# Patient Record
Sex: FEMALE | Employment: UNEMPLOYED | URBAN - METROPOLITAN AREA
[De-identification: names, ages, dates, MRNs, and addresses within clinical notes are randomized per-mention and may not be internally consistent; named-entity substitution may affect disease eponyms.]

---

## 2019-01-01 ENCOUNTER — HOME CARE VISIT (OUTPATIENT)
Dept: HOSPICE | Facility: HOSPICE | Age: 84
End: 2019-01-01
Payer: MEDICARE

## 2019-01-01 ENCOUNTER — HOME CARE VISIT (OUTPATIENT)
Dept: SCHEDULING | Facility: HOME HEALTH | Age: 84
End: 2019-01-01
Payer: MEDICARE

## 2019-01-01 ENCOUNTER — HOSPITAL ENCOUNTER (INPATIENT)
Age: 84
LOS: 7 days | Discharge: HOME | End: 2019-07-15
Attending: INTERNAL MEDICINE | Admitting: INTERNAL MEDICINE

## 2019-01-01 ENCOUNTER — HOSPICE ADMISSION (OUTPATIENT)
Dept: HOSPICE | Facility: HOSPICE | Age: 84
End: 2019-01-01
Payer: MEDICARE

## 2019-01-01 VITALS — RESPIRATION RATE: 19 BRPM | HEART RATE: 104 BPM | SYSTOLIC BLOOD PRESSURE: 84 MMHG | DIASTOLIC BLOOD PRESSURE: 58 MMHG

## 2019-01-01 VITALS — DIASTOLIC BLOOD PRESSURE: 60 MMHG | SYSTOLIC BLOOD PRESSURE: 92 MMHG | RESPIRATION RATE: 22 BRPM | HEART RATE: 112 BPM

## 2019-01-01 VITALS
HEART RATE: 84 BPM | RESPIRATION RATE: 16 BRPM | SYSTOLIC BLOOD PRESSURE: 120 MMHG | DIASTOLIC BLOOD PRESSURE: 61 MMHG | TEMPERATURE: 96.5 F

## 2019-01-01 VITALS — RESPIRATION RATE: 22 BRPM | DIASTOLIC BLOOD PRESSURE: 60 MMHG | HEART RATE: 80 BPM | SYSTOLIC BLOOD PRESSURE: 92 MMHG

## 2019-01-01 VITALS — SYSTOLIC BLOOD PRESSURE: 80 MMHG | HEART RATE: 105 BPM | RESPIRATION RATE: 16 BRPM | DIASTOLIC BLOOD PRESSURE: 50 MMHG

## 2019-01-01 VITALS
HEART RATE: 89 BPM | DIASTOLIC BLOOD PRESSURE: 52 MMHG | SYSTOLIC BLOOD PRESSURE: 88 MMHG | HEART RATE: 98 BPM | SYSTOLIC BLOOD PRESSURE: 84 MMHG | RESPIRATION RATE: 16 BRPM | RESPIRATION RATE: 17 BRPM | DIASTOLIC BLOOD PRESSURE: 54 MMHG

## 2019-01-01 VITALS — HEART RATE: 96 BPM | RESPIRATION RATE: 20 BRPM | SYSTOLIC BLOOD PRESSURE: 90 MMHG | DIASTOLIC BLOOD PRESSURE: 62 MMHG

## 2019-01-01 VITALS
TEMPERATURE: 97.8 F | DIASTOLIC BLOOD PRESSURE: 60 MMHG | RESPIRATION RATE: 16 BRPM | SYSTOLIC BLOOD PRESSURE: 90 MMHG | HEART RATE: 104 BPM

## 2019-01-01 VITALS — RESPIRATION RATE: 22 BRPM | DIASTOLIC BLOOD PRESSURE: 62 MMHG | SYSTOLIC BLOOD PRESSURE: 110 MMHG | HEART RATE: 88 BPM

## 2019-01-01 VITALS
RESPIRATION RATE: 22 BRPM | SYSTOLIC BLOOD PRESSURE: 100 MMHG | HEART RATE: 104 BPM | TEMPERATURE: 98.6 F | DIASTOLIC BLOOD PRESSURE: 60 MMHG

## 2019-01-01 VITALS — DIASTOLIC BLOOD PRESSURE: 62 MMHG | HEART RATE: 92 BPM | RESPIRATION RATE: 22 BRPM | SYSTOLIC BLOOD PRESSURE: 90 MMHG

## 2019-01-01 VITALS — HEART RATE: 96 BPM | DIASTOLIC BLOOD PRESSURE: 60 MMHG | RESPIRATION RATE: 24 BRPM | SYSTOLIC BLOOD PRESSURE: 90 MMHG

## 2019-01-01 VITALS
DIASTOLIC BLOOD PRESSURE: 80 MMHG | TEMPERATURE: 98.4 F | SYSTOLIC BLOOD PRESSURE: 110 MMHG | HEART RATE: 108 BPM | RESPIRATION RATE: 22 BRPM

## 2019-01-01 VITALS
DIASTOLIC BLOOD PRESSURE: 66 MMHG | SYSTOLIC BLOOD PRESSURE: 102 MMHG | DIASTOLIC BLOOD PRESSURE: 68 MMHG | SYSTOLIC BLOOD PRESSURE: 102 MMHG | RESPIRATION RATE: 19 BRPM | HEART RATE: 95 BPM | RESPIRATION RATE: 17 BRPM

## 2019-01-01 VITALS — HEART RATE: 100 BPM | DIASTOLIC BLOOD PRESSURE: 70 MMHG | SYSTOLIC BLOOD PRESSURE: 140 MMHG | RESPIRATION RATE: 24 BRPM

## 2019-01-01 VITALS
SYSTOLIC BLOOD PRESSURE: 124 MMHG | TEMPERATURE: 98.3 F | RESPIRATION RATE: 24 BRPM | HEART RATE: 96 BPM | DIASTOLIC BLOOD PRESSURE: 70 MMHG

## 2019-01-01 VITALS — HEART RATE: 104 BPM | SYSTOLIC BLOOD PRESSURE: 90 MMHG | DIASTOLIC BLOOD PRESSURE: 60 MMHG | RESPIRATION RATE: 22 BRPM

## 2019-01-01 VITALS — RESPIRATION RATE: 20 BRPM | TEMPERATURE: 98.6 F | HEART RATE: 100 BPM

## 2019-01-01 VITALS — HEART RATE: 115 BPM

## 2019-01-01 DIAGNOSIS — I27.82 CHRONIC SEPTIC PULMONARY EMBOLISM WITH ACUTE COR PULMONALE (HCC): ICD-10-CM

## 2019-01-01 DIAGNOSIS — I26.01 CHRONIC SEPTIC PULMONARY EMBOLISM WITH ACUTE COR PULMONALE (HCC): ICD-10-CM

## 2019-01-01 DIAGNOSIS — I50.43 ACUTE ON CHRONIC COMBINED SYSTOLIC AND DIASTOLIC CONGESTIVE HEART FAILURE (HCC): ICD-10-CM

## 2019-01-01 DIAGNOSIS — J44.1 COPD EXACERBATION (HCC): ICD-10-CM

## 2019-01-01 DIAGNOSIS — J18.9 HCAP (HEALTHCARE-ASSOCIATED PNEUMONIA): ICD-10-CM

## 2019-01-01 PROCEDURE — 0651 HSPC ROUTINE HOME CARE

## 2019-01-01 PROCEDURE — HOSPICE MEDICATION HC HH HOSPICE MEDICATION

## 2019-01-01 PROCEDURE — G0299 HHS/HOSPICE OF RN EA 15 MIN: HCPCS

## 2019-01-01 PROCEDURE — 74011250637 HC RX REV CODE- 250/637: Performed by: NURSE PRACTITIONER

## 2019-01-01 PROCEDURE — 3336590001 HSPC ROOM AND BOARD

## 2019-01-01 PROCEDURE — G0155 HHCP-SVS OF CSW,EA 15 MIN: HCPCS

## 2019-01-01 PROCEDURE — G0156 HHCP-SVS OF AIDE,EA 15 MIN: HCPCS

## 2019-01-01 PROCEDURE — T4541 LARGE DISPOSABLE UNDERPAD: HCPCS

## 2019-01-01 PROCEDURE — 74011636637 HC RX REV CODE- 636/637: Performed by: NURSE PRACTITIONER

## 2019-01-01 PROCEDURE — T4521 ADULT SIZE BRIEF/DIAPER SM: HCPCS

## 2019-01-01 PROCEDURE — 74011000250 HC RX REV CODE- 250: Performed by: NURSE PRACTITIONER

## 2019-01-01 PROCEDURE — A4927 NON-STERILE GLOVES: HCPCS

## 2019-01-01 PROCEDURE — T4523 ADULT SIZE BRIEF/DIAPER LG: HCPCS

## 2019-01-01 PROCEDURE — A6250 SKIN SEAL PROTECT MOISTURIZR: HCPCS

## 2019-01-01 PROCEDURE — 99232 SBSQ HOSP IP/OBS MODERATE 35: CPT | Performed by: INTERNAL MEDICINE

## 2019-01-01 PROCEDURE — 3331090004 HSPC SERVICE INTENSITY ADD-ON

## 2019-01-01 PROCEDURE — 0656 HSPC GENERAL INPATIENT

## 2019-01-01 PROCEDURE — A9270 NON-COVERED ITEM OR SERVICE: HCPCS

## 2019-01-01 PROCEDURE — 74011250637 HC RX REV CODE- 250/637: Performed by: FAMILY MEDICINE

## 2019-01-01 PROCEDURE — 3336500001 HSPC ELECTION

## 2019-01-01 RX ORDER — PREDNISONE 10 MG/1
10 TABLET ORAL 2 TIMES DAILY WITH MEALS
Status: DISCONTINUED | OUTPATIENT
Start: 2019-01-01 | End: 2019-01-01 | Stop reason: HOSPADM

## 2019-01-01 RX ORDER — FACIAL-BODY WIPES
10 EACH TOPICAL AS NEEDED
Status: DISCONTINUED | OUTPATIENT
Start: 2019-01-01 | End: 2019-01-01 | Stop reason: HOSPADM

## 2019-01-01 RX ORDER — PANTOPRAZOLE SODIUM 40 MG/1
40 TABLET, DELAYED RELEASE ORAL DAILY
COMMUNITY
End: 2019-01-01

## 2019-01-01 RX ORDER — FLUTICASONE PROPIONATE 50 MCG
2 SPRAY, SUSPENSION (ML) NASAL DAILY
Status: ON HOLD | COMMUNITY
End: 2019-01-01 | Stop reason: SDUPTHER

## 2019-01-01 RX ORDER — SENNOSIDES 8.6 MG/1
1 TABLET ORAL 2 TIMES DAILY
Qty: 10 TAB | Refills: 0 | Status: SHIPPED | OUTPATIENT
Start: 2019-01-01

## 2019-01-01 RX ORDER — MORPHINE SULFATE 100 MG/5ML
5 SOLUTION ORAL
Status: DISCONTINUED | OUTPATIENT
Start: 2019-01-01 | End: 2019-01-01 | Stop reason: HOSPADM

## 2019-01-01 RX ORDER — IPRATROPIUM BROMIDE AND ALBUTEROL SULFATE 2.5; .5 MG/3ML; MG/3ML
3 SOLUTION RESPIRATORY (INHALATION)
Qty: 30 NEBULE | Refills: 0 | Status: SHIPPED | OUTPATIENT
Start: 2019-01-01

## 2019-01-01 RX ORDER — FLUTICASONE FUROATE AND VILANTEROL 100; 25 UG/1; UG/1
1 POWDER RESPIRATORY (INHALATION) DAILY
Qty: 1 INHALER | Refills: 0 | Status: SHIPPED | OUTPATIENT
Start: 2019-01-01

## 2019-01-01 RX ORDER — HALOPERIDOL 5 MG/ML
2 INJECTION INTRAMUSCULAR
Status: DISCONTINUED | OUTPATIENT
Start: 2019-01-01 | End: 2019-01-01

## 2019-01-01 RX ORDER — FLUTICASONE FUROATE AND VILANTEROL 100; 25 UG/1; UG/1
1 POWDER RESPIRATORY (INHALATION) DAILY
Status: ON HOLD | COMMUNITY
Start: 2019-01-01 | End: 2019-01-01 | Stop reason: SDUPTHER

## 2019-01-01 RX ORDER — ACETAMINOPHEN 325 MG/1
650 TABLET ORAL
Status: DISCONTINUED | OUTPATIENT
Start: 2019-01-01 | End: 2019-01-01 | Stop reason: HOSPADM

## 2019-01-01 RX ORDER — ACETAMINOPHEN 650 MG/1
650 SUPPOSITORY RECTAL
Status: DISCONTINUED | OUTPATIENT
Start: 2019-01-01 | End: 2019-01-01

## 2019-01-01 RX ORDER — FUROSEMIDE 20 MG/1
20 TABLET ORAL DAILY
Qty: 5 TAB | Refills: 0 | Status: SHIPPED | OUTPATIENT
Start: 2019-01-01

## 2019-01-01 RX ORDER — PREDNISONE 10 MG/1
20 TABLET ORAL
Status: DISCONTINUED | OUTPATIENT
Start: 2019-01-01 | End: 2019-01-01

## 2019-01-01 RX ORDER — OMEPRAZOLE 20 MG/1
20 CAPSULE, DELAYED RELEASE ORAL DAILY
Qty: 5 CAP | Refills: 0 | Status: SHIPPED | OUTPATIENT
Start: 2019-01-01

## 2019-01-01 RX ORDER — CLONAZEPAM 0.5 MG/1
0.5 TABLET ORAL EVERY 12 HOURS
Qty: 10 TAB | Refills: 0 | Status: SHIPPED | OUTPATIENT
Start: 2019-01-01

## 2019-01-01 RX ORDER — LORAZEPAM 1 MG/1
1 TABLET ORAL EVERY 12 HOURS
Status: DISCONTINUED | OUTPATIENT
Start: 2019-01-01 | End: 2019-01-01 | Stop reason: HOSPADM

## 2019-01-01 RX ORDER — SENNOSIDES 8.6 MG/1
1 TABLET ORAL
COMMUNITY
End: 2019-01-01

## 2019-01-01 RX ORDER — LORATADINE 10 MG/1
10 TABLET ORAL
Status: DISCONTINUED | OUTPATIENT
Start: 2019-01-01 | End: 2019-01-01

## 2019-01-01 RX ORDER — BUDESONIDE AND FORMOTEROL FUMARATE DIHYDRATE 160; 4.5 UG/1; UG/1
2 AEROSOL RESPIRATORY (INHALATION) 2 TIMES DAILY
Status: DISCONTINUED | OUTPATIENT
Start: 2019-01-01 | End: 2019-01-01

## 2019-01-01 RX ORDER — ACETAMINOPHEN 325 MG/1
650 TABLET ORAL
Qty: 10 TAB | Refills: 0 | Status: SHIPPED | OUTPATIENT
Start: 2019-01-01

## 2019-01-01 RX ORDER — FLUTICASONE FUROATE AND VILANTEROL 100; 25 UG/1; UG/1
1 POWDER RESPIRATORY (INHALATION) DAILY
Status: DISCONTINUED | OUTPATIENT
Start: 2019-01-01 | End: 2019-01-01 | Stop reason: HOSPADM

## 2019-01-01 RX ORDER — FLUCONAZOLE 100 MG/1
200 TABLET ORAL
Status: DISCONTINUED | OUTPATIENT
Start: 2019-01-01 | End: 2019-01-01 | Stop reason: HOSPADM

## 2019-01-01 RX ORDER — MORPHINE SULFATE 2 MG/ML
2 INJECTION, SOLUTION INTRAMUSCULAR; INTRAVENOUS
Status: DISCONTINUED | OUTPATIENT
Start: 2019-01-01 | End: 2019-01-01 | Stop reason: HOSPADM

## 2019-01-01 RX ORDER — METHIMAZOLE 10 MG/1
5 TABLET ORAL DAILY
Status: DISCONTINUED | OUTPATIENT
Start: 2019-01-01 | End: 2019-01-01 | Stop reason: HOSPADM

## 2019-01-01 RX ORDER — DOCUSATE SODIUM 100 MG/1
100 CAPSULE, LIQUID FILLED ORAL 2 TIMES DAILY
COMMUNITY
End: 2019-01-01

## 2019-01-01 RX ORDER — FUROSEMIDE 20 MG/1
20 TABLET ORAL DAILY
Status: ON HOLD | COMMUNITY
End: 2019-01-01 | Stop reason: SDUPTHER

## 2019-01-01 RX ORDER — MORPHINE SULFATE 2 MG/ML
2 INJECTION, SOLUTION INTRAMUSCULAR; INTRAVENOUS
Status: DISCONTINUED | OUTPATIENT
Start: 2019-01-01 | End: 2019-01-01

## 2019-01-01 RX ORDER — ONDANSETRON 4 MG/1
4 TABLET, ORALLY DISINTEGRATING ORAL
Status: ON HOLD | COMMUNITY
End: 2019-01-01 | Stop reason: SDUPTHER

## 2019-01-01 RX ORDER — HALOPERIDOL 2 MG/ML
2 SOLUTION ORAL
Status: DISCONTINUED | OUTPATIENT
Start: 2019-01-01 | End: 2019-01-01

## 2019-01-01 RX ORDER — WARFARIN 2 MG/1
2 TABLET ORAL
Status: DISCONTINUED | OUTPATIENT
Start: 2019-01-01 | End: 2019-01-01

## 2019-01-01 RX ORDER — FUROSEMIDE 40 MG/1
20 TABLET ORAL DAILY
Status: DISCONTINUED | OUTPATIENT
Start: 2019-01-01 | End: 2019-01-01 | Stop reason: HOSPADM

## 2019-01-01 RX ORDER — FLUTICASONE PROPIONATE 50 MCG
2 SPRAY, SUSPENSION (ML) NASAL DAILY
Status: DISCONTINUED | OUTPATIENT
Start: 2019-01-01 | End: 2019-01-01 | Stop reason: HOSPADM

## 2019-01-01 RX ORDER — GLYCOPYRROLATE 0.2 MG/ML
0.2 INJECTION INTRAMUSCULAR; INTRAVENOUS
Status: DISCONTINUED | OUTPATIENT
Start: 2019-01-01 | End: 2019-01-01

## 2019-01-01 RX ORDER — PREDNISONE 10 MG/1
10 TABLET ORAL 2 TIMES DAILY WITH MEALS
Qty: 10 TAB | Refills: 0 | Status: SHIPPED | OUTPATIENT
Start: 2019-01-01

## 2019-01-01 RX ORDER — IPRATROPIUM BROMIDE AND ALBUTEROL SULFATE 2.5; .5 MG/3ML; MG/3ML
3 SOLUTION RESPIRATORY (INHALATION)
Status: DISCONTINUED | OUTPATIENT
Start: 2019-01-01 | End: 2019-01-01 | Stop reason: HOSPADM

## 2019-01-01 RX ORDER — CLONAZEPAM 0.5 MG/1
0.5 TABLET ORAL
Qty: 15 TAB | Refills: 0 | Status: SHIPPED | OUTPATIENT
Start: 2019-01-01

## 2019-01-01 RX ORDER — CLONAZEPAM 1 MG/1
0.5 TABLET ORAL
Status: DISCONTINUED | OUTPATIENT
Start: 2019-01-01 | End: 2019-01-01

## 2019-01-01 RX ORDER — CETIRIZINE HCL 10 MG
10 TABLET ORAL
Qty: 5 TAB | Refills: 0 | Status: SHIPPED | OUTPATIENT
Start: 2019-01-01

## 2019-01-01 RX ORDER — FLUTICASONE PROPIONATE 50 MCG
2 SPRAY, SUSPENSION (ML) NASAL DAILY
Qty: 1 BOTTLE | Refills: 0 | Status: SHIPPED | OUTPATIENT
Start: 2019-01-01

## 2019-01-01 RX ORDER — CETIRIZINE HCL 10 MG
10 TABLET ORAL
Status: ON HOLD | COMMUNITY
End: 2019-01-01 | Stop reason: SDUPTHER

## 2019-01-01 RX ORDER — FLUCONAZOLE 200 MG/1
200 TABLET ORAL DAILY
Qty: 1 TAB | Refills: 0 | Status: SHIPPED | OUTPATIENT
Start: 2019-01-01 | End: 2019-01-01

## 2019-01-01 RX ORDER — SENNOSIDES 8.6 MG/1
1 TABLET ORAL 2 TIMES DAILY
Status: DISCONTINUED | OUTPATIENT
Start: 2019-01-01 | End: 2019-01-01 | Stop reason: HOSPADM

## 2019-01-01 RX ORDER — CETIRIZINE HCL 10 MG
10 TABLET ORAL
Status: DISCONTINUED | OUTPATIENT
Start: 2019-01-01 | End: 2019-01-01 | Stop reason: HOSPADM

## 2019-01-01 RX ORDER — METHIMAZOLE 10 MG/1
5 TABLET ORAL DAILY
Status: DISCONTINUED | OUTPATIENT
Start: 2019-01-01 | End: 2019-01-01 | Stop reason: SDUPTHER

## 2019-01-01 RX ORDER — SODIUM CHLORIDE 0.9 % (FLUSH) 0.9 %
3 SYRINGE (ML) INJECTION EVERY 12 HOURS
Status: DISCONTINUED | OUTPATIENT
Start: 2019-01-01 | End: 2019-01-01

## 2019-01-01 RX ORDER — CLONAZEPAM 1 MG/1
0.5 TABLET ORAL EVERY 12 HOURS
Status: DISCONTINUED | OUTPATIENT
Start: 2019-01-01 | End: 2019-01-01

## 2019-01-01 RX ORDER — POLYVINYL ALCOHOL 14 MG/ML
1 SOLUTION/ DROPS OPHTHALMIC AS NEEDED
Status: DISCONTINUED | OUTPATIENT
Start: 2019-01-01 | End: 2019-01-01 | Stop reason: HOSPADM

## 2019-01-01 RX ORDER — MORPHINE SULFATE 100 MG/5ML
5 SOLUTION ORAL
Qty: 30 ML | Refills: 0 | Status: SHIPPED | OUTPATIENT
Start: 2019-01-01 | End: 2019-01-01

## 2019-01-01 RX ORDER — METHIMAZOLE 10 MG/1
5 TABLET ORAL DAILY
Qty: 1 TAB | Refills: 0 | Status: SHIPPED | OUTPATIENT
Start: 2019-01-01

## 2019-01-01 RX ORDER — CETIRIZINE HCL 10 MG
10 TABLET ORAL
Status: DISCONTINUED | OUTPATIENT
Start: 2019-01-01 | End: 2019-01-01 | Stop reason: SDUPTHER

## 2019-01-01 RX ORDER — POLYVINYL ALCOHOL 14 MG/ML
1 SOLUTION/ DROPS OPHTHALMIC AS NEEDED
Qty: 15 ML | Refills: 0 | Status: SHIPPED | OUTPATIENT
Start: 2019-01-01 | End: 2019-08-14

## 2019-01-01 RX ORDER — LORAZEPAM 1 MG/1
1 TABLET ORAL
Status: DISCONTINUED | OUTPATIENT
Start: 2019-01-01 | End: 2019-01-01 | Stop reason: HOSPADM

## 2019-01-01 RX ORDER — ONDANSETRON 4 MG/1
4 TABLET, ORALLY DISINTEGRATING ORAL
Status: DISCONTINUED | OUTPATIENT
Start: 2019-01-01 | End: 2019-01-01 | Stop reason: HOSPADM

## 2019-01-01 RX ORDER — BUDESONIDE 0.5 MG/2ML
500 INHALANT ORAL 2 TIMES DAILY
Status: ON HOLD | COMMUNITY
End: 2019-01-01

## 2019-01-01 RX ORDER — MORPHINE SULFATE 100 MG/5ML
5 SOLUTION ORAL
Status: DISCONTINUED | OUTPATIENT
Start: 2019-01-01 | End: 2019-01-01

## 2019-01-01 RX ORDER — SODIUM CHLORIDE 0.9 % (FLUSH) 0.9 %
3 SYRINGE (ML) INJECTION AS NEEDED
Status: DISCONTINUED | OUTPATIENT
Start: 2019-01-01 | End: 2019-01-01

## 2019-01-01 RX ORDER — LORAZEPAM 1 MG/1
1 TABLET ORAL
Status: COMPLETED | OUTPATIENT
Start: 2019-01-01 | End: 2019-01-01

## 2019-01-01 RX ORDER — PREDNISONE 10 MG/1
40 TABLET ORAL
COMMUNITY
End: 2019-01-01

## 2019-01-01 RX ORDER — ONDANSETRON 4 MG/1
4 TABLET, ORALLY DISINTEGRATING ORAL
Qty: 10 TAB | Refills: 0 | Status: SHIPPED | OUTPATIENT
Start: 2019-01-01

## 2019-01-01 RX ORDER — CLONAZEPAM 0.5 MG/1
0.5 TABLET ORAL
Status: ON HOLD | COMMUNITY
End: 2019-01-01 | Stop reason: SDUPTHER

## 2019-01-01 RX ORDER — ARFORMOTEROL TARTRATE 15 UG/2ML
15 SOLUTION RESPIRATORY (INHALATION) 2 TIMES DAILY
Status: ON HOLD | COMMUNITY
End: 2019-01-01

## 2019-01-01 RX ORDER — PANTOPRAZOLE SODIUM 40 MG/1
40 TABLET, DELAYED RELEASE ORAL
Status: DISCONTINUED | OUTPATIENT
Start: 2019-01-01 | End: 2019-01-01 | Stop reason: HOSPADM

## 2019-01-01 RX ORDER — HYOSCYAMINE SULFATE 0.12 MG/1
0.12 TABLET SUBLINGUAL
Status: DISCONTINUED | OUTPATIENT
Start: 2019-01-01 | End: 2019-01-01

## 2019-01-01 RX ORDER — BUDESONIDE AND FORMOTEROL FUMARATE DIHYDRATE 160; 4.5 UG/1; UG/1
2 AEROSOL RESPIRATORY (INHALATION) 2 TIMES DAILY
Status: DISCONTINUED | OUTPATIENT
Start: 2019-01-01 | End: 2019-01-01 | Stop reason: SDUPTHER

## 2019-01-01 RX ORDER — BUDESONIDE AND FORMOTEROL FUMARATE DIHYDRATE 160; 4.5 UG/1; UG/1
2 AEROSOL RESPIRATORY (INHALATION) 2 TIMES DAILY
COMMUNITY
End: 2019-01-01

## 2019-01-01 RX ORDER — CETIRIZINE HCL 10 MG
10 TABLET ORAL
COMMUNITY
End: 2019-01-01

## 2019-01-01 RX ORDER — PREDNISONE 10 MG/1
10 TABLET ORAL
Status: DISCONTINUED | OUTPATIENT
Start: 2019-01-01 | End: 2019-01-01

## 2019-01-01 RX ADMIN — PANTOPRAZOLE SODIUM 40 MG: 40 TABLET, DELAYED RELEASE ORAL at 08:48

## 2019-01-01 RX ADMIN — PREDNISONE 10 MG: 10 TABLET ORAL at 17:08

## 2019-01-01 RX ADMIN — MORPHINE SULFATE 5 MG: 100 SOLUTION ORAL at 19:57

## 2019-01-01 RX ADMIN — WARFARIN SODIUM 2 MG: 2 TABLET ORAL at 17:00

## 2019-01-01 RX ADMIN — FLUCONAZOLE 200 MG: 100 TABLET ORAL at 09:06

## 2019-01-01 RX ADMIN — IPRATROPIUM BROMIDE AND ALBUTEROL SULFATE 3 ML: .5; 3 SOLUTION RESPIRATORY (INHALATION) at 19:38

## 2019-01-01 RX ADMIN — MORPHINE SULFATE 5 MG: 100 SOLUTION ORAL at 17:59

## 2019-01-01 RX ADMIN — IPRATROPIUM BROMIDE AND ALBUTEROL SULFATE 3 ML: .5; 3 SOLUTION RESPIRATORY (INHALATION) at 19:47

## 2019-01-01 RX ADMIN — CLONAZEPAM 0.5 MG: 1 TABLET ORAL at 09:59

## 2019-01-01 RX ADMIN — PREDNISONE 20 MG: 10 TABLET ORAL at 08:16

## 2019-01-01 RX ADMIN — IPRATROPIUM BROMIDE AND ALBUTEROL SULFATE 3 ML: .5; 3 SOLUTION RESPIRATORY (INHALATION) at 20:15

## 2019-01-01 RX ADMIN — IPRATROPIUM BROMIDE AND ALBUTEROL SULFATE 3 ML: .5; 3 SOLUTION RESPIRATORY (INHALATION) at 21:45

## 2019-01-01 RX ADMIN — PREDNISONE 10 MG: 10 TABLET ORAL at 18:05

## 2019-01-01 RX ADMIN — PREDNISONE 20 MG: 10 TABLET ORAL at 07:44

## 2019-01-01 RX ADMIN — FLUTICASONE PROPIONATE 2 SPRAY: 50 SPRAY, METERED NASAL at 09:00

## 2019-01-01 RX ADMIN — IPRATROPIUM BROMIDE AND ALBUTEROL SULFATE 3 ML: .5; 3 SOLUTION RESPIRATORY (INHALATION) at 13:49

## 2019-01-01 RX ADMIN — METHIMAZOLE 5 MG: 10 TABLET ORAL at 09:27

## 2019-01-01 RX ADMIN — PANTOPRAZOLE SODIUM 40 MG: 40 TABLET, DELAYED RELEASE ORAL at 08:16

## 2019-01-01 RX ADMIN — MORPHINE SULFATE 5 MG: 100 SOLUTION ORAL at 14:07

## 2019-01-01 RX ADMIN — BUDESONIDE AND FORMOTEROL FUMARATE DIHYDRATE 2 PUFF: 160; 4.5 AEROSOL RESPIRATORY (INHALATION) at 19:00

## 2019-01-01 RX ADMIN — PREDNISONE 10 MG: 10 TABLET ORAL at 17:27

## 2019-01-01 RX ADMIN — LORAZEPAM 1 MG: 1 TABLET ORAL at 12:48

## 2019-01-01 RX ADMIN — LORAZEPAM 1 MG: 1 TABLET ORAL at 10:27

## 2019-01-01 RX ADMIN — PANTOPRAZOLE SODIUM 40 MG: 40 TABLET, DELAYED RELEASE ORAL at 08:47

## 2019-01-01 RX ADMIN — BUDESONIDE AND FORMOTEROL FUMARATE DIHYDRATE 2 PUFF: 160; 4.5 AEROSOL RESPIRATORY (INHALATION) at 18:00

## 2019-01-01 RX ADMIN — METHIMAZOLE 5 MG: 10 TABLET ORAL at 09:00

## 2019-01-01 RX ADMIN — LORAZEPAM 1 MG: 1 TABLET ORAL at 20:38

## 2019-01-01 RX ADMIN — PREDNISONE 20 MG: 10 TABLET ORAL at 07:53

## 2019-01-01 RX ADMIN — MORPHINE SULFATE 5 MG: 100 SOLUTION ORAL at 19:54

## 2019-01-01 RX ADMIN — PANTOPRAZOLE SODIUM 40 MG: 40 TABLET, DELAYED RELEASE ORAL at 08:31

## 2019-01-01 RX ADMIN — LORAZEPAM 1 MG: 1 TABLET ORAL at 20:42

## 2019-01-01 RX ADMIN — SENNOSIDES 8.6 MG: 8.6 TABLET, FILM COATED ORAL at 20:37

## 2019-01-01 RX ADMIN — PREDNISONE 10 MG: 10 TABLET ORAL at 17:00

## 2019-01-01 RX ADMIN — PANTOPRAZOLE SODIUM 40 MG: 40 TABLET, DELAYED RELEASE ORAL at 07:43

## 2019-01-01 RX ADMIN — PANTOPRAZOLE SODIUM 40 MG: 40 TABLET, DELAYED RELEASE ORAL at 07:44

## 2019-01-01 RX ADMIN — IPRATROPIUM BROMIDE AND ALBUTEROL SULFATE 3 ML: .5; 3 SOLUTION RESPIRATORY (INHALATION) at 08:20

## 2019-01-01 RX ADMIN — MORPHINE SULFATE 5 MG: 100 SOLUTION ORAL at 21:45

## 2019-01-01 RX ADMIN — FUROSEMIDE 20 MG: 40 TABLET ORAL at 08:49

## 2019-01-01 RX ADMIN — LORAZEPAM 1 MG: 1 TABLET ORAL at 16:03

## 2019-01-01 RX ADMIN — FUROSEMIDE 20 MG: 40 TABLET ORAL at 09:26

## 2019-01-01 RX ADMIN — CLONAZEPAM 0.5 MG: 1 TABLET ORAL at 20:56

## 2019-01-01 RX ADMIN — LORAZEPAM 1 MG: 1 TABLET ORAL at 21:45

## 2019-01-01 RX ADMIN — IPRATROPIUM BROMIDE AND ALBUTEROL SULFATE 3 ML: .5; 3 SOLUTION RESPIRATORY (INHALATION) at 21:20

## 2019-01-01 RX ADMIN — CLONAZEPAM 0.5 MG: 1 TABLET ORAL at 19:14

## 2019-01-01 RX ADMIN — SENNOSIDES 8.6 MG: 8.6 TABLET, FILM COATED ORAL at 13:50

## 2019-01-01 RX ADMIN — MORPHINE SULFATE 5 MG: 100 SOLUTION ORAL at 05:00

## 2019-01-01 RX ADMIN — LORAZEPAM 1 MG: 1 TABLET ORAL at 09:26

## 2019-01-01 RX ADMIN — BISACODYL 10 MG: 10 SUPPOSITORY RECTAL at 09:03

## 2019-01-01 RX ADMIN — PREDNISONE 20 MG: 10 TABLET ORAL at 08:48

## 2019-01-01 RX ADMIN — SENNOSIDES 8.6 MG: 8.6 TABLET, FILM COATED ORAL at 08:48

## 2019-01-01 RX ADMIN — FUROSEMIDE 20 MG: 40 TABLET ORAL at 08:26

## 2019-01-01 RX ADMIN — LORAZEPAM 1 MG: 1 TABLET ORAL at 08:50

## 2019-01-01 RX ADMIN — FLUTICASONE FUROATE AND VILANTEROL TRIFENATATE 1 PUFF: 100; 25 POWDER RESPIRATORY (INHALATION) at 11:05

## 2019-01-01 RX ADMIN — FLUTICASONE FUROATE AND VILANTEROL TRIFENATATE 1 PUFF: 100; 25 POWDER RESPIRATORY (INHALATION) at 09:00

## 2019-01-01 RX ADMIN — PREDNISONE 10 MG: 10 TABLET ORAL at 17:11

## 2019-01-01 RX ADMIN — CLONAZEPAM 0.5 MG: 1 TABLET ORAL at 19:42

## 2019-01-01 RX ADMIN — PREDNISONE 20 MG: 10 TABLET ORAL at 07:42

## 2019-01-01 RX ADMIN — LORAZEPAM 1 MG: 1 TABLET ORAL at 20:37

## 2019-01-01 RX ADMIN — PREDNISONE 20 MG: 10 TABLET ORAL at 08:31

## 2019-01-01 RX ADMIN — FLUCONAZOLE 200 MG: 100 TABLET ORAL at 10:25

## 2019-01-01 RX ADMIN — LORAZEPAM 1 MG: 1 TABLET ORAL at 13:49

## 2019-01-01 RX ADMIN — LORAZEPAM 1 MG: 1 TABLET ORAL at 14:56

## 2019-01-01 RX ADMIN — MORPHINE SULFATE 5 MG: 100 SOLUTION ORAL at 14:15

## 2019-01-01 RX ADMIN — FLUTICASONE FUROATE AND VILANTEROL TRIFENATATE 1 PUFF: 100; 25 POWDER RESPIRATORY (INHALATION) at 08:27

## 2019-01-01 RX ADMIN — PREDNISONE 20 MG: 10 TABLET ORAL at 08:47

## 2019-01-01 RX ADMIN — FLUTICASONE PROPIONATE 2 SPRAY: 50 SPRAY, METERED NASAL at 11:00

## 2019-01-01 RX ADMIN — FLUTICASONE PROPIONATE 2 SPRAY: 50 SPRAY, METERED NASAL at 09:26

## 2019-01-01 RX ADMIN — PREDNISONE 10 MG: 10 TABLET ORAL at 16:59

## 2019-01-01 RX ADMIN — BUDESONIDE AND FORMOTEROL FUMARATE DIHYDRATE 2 PUFF: 160; 4.5 AEROSOL RESPIRATORY (INHALATION) at 09:00

## 2019-01-01 RX ADMIN — FLUTICASONE PROPIONATE 2 SPRAY: 50 SPRAY, METERED NASAL at 08:31

## 2019-01-01 RX ADMIN — CLONAZEPAM 0.5 MG: 1 TABLET ORAL at 18:10

## 2019-01-01 RX ADMIN — CLONAZEPAM 0.5 MG: 1 TABLET ORAL at 08:42

## 2019-01-01 RX ADMIN — MORPHINE SULFATE 5 MG: 100 SOLUTION ORAL at 20:56

## 2019-01-01 RX ADMIN — METHIMAZOLE 5 MG: 10 TABLET ORAL at 08:27

## 2019-01-01 RX ADMIN — CLONAZEPAM 0.5 MG: 1 TABLET ORAL at 08:35

## 2019-01-01 RX ADMIN — CLONAZEPAM 0.5 MG: 1 TABLET ORAL at 20:03

## 2019-01-01 RX ADMIN — IPRATROPIUM BROMIDE AND ALBUTEROL SULFATE 3 ML: .5; 3 SOLUTION RESPIRATORY (INHALATION) at 19:19

## 2019-01-01 RX ADMIN — PANTOPRAZOLE SODIUM 40 MG: 40 TABLET, DELAYED RELEASE ORAL at 07:53

## 2019-01-01 RX ADMIN — MORPHINE SULFATE 5 MG: 100 SOLUTION ORAL at 12:48

## 2019-01-01 RX ADMIN — FLUTICASONE PROPIONATE 2 SPRAY: 50 SPRAY, METERED NASAL at 08:26

## 2019-01-01 RX ADMIN — LORAZEPAM 1 MG: 1 TABLET ORAL at 08:26

## 2019-01-01 RX ADMIN — MORPHINE SULFATE 5 MG: 20 SOLUTION ORAL at 10:15

## 2019-07-08 PROBLEM — I50.43 ACUTE ON CHRONIC COMBINED SYSTOLIC AND DIASTOLIC CONGESTIVE HEART FAILURE (HCC): Status: ACTIVE | Noted: 2019-01-01

## 2019-07-08 PROBLEM — J96.21 ACUTE ON CHRONIC RESPIRATORY FAILURE WITH HYPOXIA (HCC): Status: ACTIVE | Noted: 2019-01-01

## 2019-07-08 PROBLEM — I50.42 CHRONIC COMBINED SYSTOLIC AND DIASTOLIC CHF (CONGESTIVE HEART FAILURE) (HCC): Status: ACTIVE | Noted: 2019-01-01

## 2019-07-08 PROBLEM — J18.9 HCAP (HEALTHCARE-ASSOCIATED PNEUMONIA): Status: ACTIVE | Noted: 2019-01-01

## 2019-07-08 PROBLEM — I26.99 PULMONARY EMBOLI (HCC): Status: ACTIVE | Noted: 2019-01-01

## 2019-07-08 PROBLEM — E05.00 GRAVES' DISEASE: Status: ACTIVE | Noted: 2019-01-01

## 2019-07-08 PROBLEM — A41.9 SEPSIS (HCC): Status: ACTIVE | Noted: 2019-01-01

## 2019-07-08 PROBLEM — E87.20 LACTIC ACIDOSIS: Status: ACTIVE | Noted: 2019-01-01

## 2019-07-08 NOTE — PROGRESS NOTES
Problem: Falls - Risk of  Goal: *Absence of Falls  Description  Pt will remain free from falls while at [address] aeb no injuries. Outcome: Progressing Towards Goal  Note:   Fall Risk Interventions:            Medication Interventions: Bed/chair exit alarm, Evaluate medications/consider consulting pharmacy    Elimination Interventions: Bed/chair exit alarm, Call light in reach, Patient to call for help with toileting needs              Problem: Patient Education: Go to Patient Education Activity  Goal: Patient/Family Education  Outcome: Resolved/Met     Problem: Pressure Injury - Risk of  Goal: *Prevention of pressure injury  Description  Document Juan Scale and appropriate interventions in the flowsheet. Outcome: Progressing Towards Goal  Note:   Pressure Injury Interventions:       Moisture Interventions: Apply protective barrier, creams and emollients, Maintain skin hydration (lotion/cream), Check for incontinence Q2 hours and as needed, Limit adult briefs    Activity Interventions: Pressure redistribution bed/mattress(bed type)    Mobility Interventions: HOB 30 degrees or less, Pressure redistribution bed/mattress (bed type), Float heels, Turn and reposition approx.  every two hours(pillow and wedges)    Nutrition Interventions: Document food/fluid/supplement intake, Offer support with meals,snacks and hydration    Friction and Shear Interventions: Apply protective barrier, creams and emollients, HOB 30 degrees or less, Minimize layers, Lift sheet                Problem: Patient Education: Go to Patient Education Activity  Goal: Patient/Family Education  Outcome: Resolved/Met     Problem: Hospice Orientation  Goal: Demonstrate understanding of hospice philosophy, plan of care, and home hospice program  Description  The patient/family/caregiver will demonstrate understanding of hospice philosophy, plan of care and the home hospice program as evidenced by participation in meeting the patient's psychosocial, spiritual, medical, and physical needs inclusive of medical supplies/equipment focusing on symptoms. Outcome: Resolved/Met     Problem: Aide Supervisory Visit  Goal: Supervision of Home Health Aide  Outcome: Progressing Towards Goal     Problem: Anticipatory Grief  Goal: Explore reactions to and verbalize acceptance of impending loss  Description  Patient/family/caregiver will explore reactions to and verbalize acceptance of impending loss. Outcome: Progressing Towards Goal     Problem: Coping and Emotional Distress  Goal: Demonstrate acceptance of terminal illness and understanding of disease progression  Description  Patient/family/caregiver will demonstrate acceptance of terminal disease and understanding of disease progression while employing appropriate coping mechanisms. Outcome: Progressing Towards Goal     Problem: Spiritual Distress  Goal: Distress heard, acknowledged, and addressed  Description  Patient/family/caregiver distress will be heard, acknowledged, and addressed throughout hospice care.   Outcome: Progressing Towards Goal     Problem: Dyspnea Due to End of Life  Goal: Demonstrate understanding of and ability to manage respiratory symptoms at end of life  Outcome: Progressing Towards Goal

## 2019-07-08 NOTE — HOSPICE
Pt admitted via EMS. Primary RN. Lesli at lunch. Dick in completed. Pain 0/10, states she feels no anxiety at this time; breathing even and unlabored; no nausea. Son at bedside. Pt rolls well. Brief changed. Pt continent and was able to tell me she had urinated. Per son, Pt has been tolerating a \"ground diet\" with soft foods and meats \"ground up. Loves grits and eggs and black coffee for breakfast. LOVES baked sweet potatoes. Water with all meals. Water provided. She is not hungry at this time. Call light, tv remote within reach.

## 2019-07-08 NOTE — PROGRESS NOTES
1900- Report received from [facility]. Patient surrounded by family and visitors and denies any needs at this time. Bed is low and locked, tab alert is in place, call light within reach. 1938- Albuterol nebulizer given per request before bed. 2003- clonazepam given po for anxiety. Pt states breathing tmts give her anxiety    2112- patient asleep.  Son at bedside

## 2019-07-08 NOTE — HSPC IDG OTHER NOTES
Bereavement Coordinator has reviewed RN Initial Comprehensive Assessment and reviewed Care Plan. I acknowledge no urgent Bereavement Care needs identified at time of admission.

## 2019-07-08 NOTE — HSPC IDG VOLUNTEER NOTES
Patient: Brenden Roman    Date: 07/08/19  Time: 7:44 PM    [facility] Volunteer Notes  VC has reviewed the initial RN Comprehensive assessment and plan of care. Acknowledge there are no immediate needs for volunteers.             Signed by: Ji Renee

## 2019-07-08 NOTE — HSPC IDG NURSE NOTES
Patient: Olekasndr Magaña    Date: 07/08/19  Time: 3:28 PM    [facility] Nurse Notes  Oleksandr Schooling 81 y/o female admitted gip for respiratory failure. Symptom management of pain/dyspnea/agitation  Pt arrived via ems on 6 liters nasal canula per Alexandru smith. Pt alert and oriented X3. Pt states she get short of breath with exertion. Pt has no shortness of breath while i'm talking to her but does state that she took something for anxiety before her ambulance ride over. Pt lungs coarse. Pt has hypoactive bowel sounds. She is continent of bowel and bladder. Assist with turns X1. Pt has some discoloration to BLE. Family states that she is a feeder. That she lacks the endurance to feed herself. Pt is total care for all adl's. Admission has been completed and inpatient GIP care plan initiated including bereavement, spiritual, and psychosocial. IDG team made aware of plan of care and immediate needs. ?  1711 Pt po meds given. Pt eating dinner, no complaints. Pt denies pain, agitation or sob. 1905 report given to shirin mcginnis            Signed by:  Charis Daniel RN

## 2019-07-08 NOTE — HSPC IDG CHAPLAIN NOTES
Patient: Wanda Fraga    Date: 07/08/19  Time: 4:59 PM    [facility]  Notes   has reviewed  Initial comprehensive Assessment and Initial Plan of Care for Wanda Fraga.  is in agreement with the plans put in place and goals set thus far.  will be making a Spiritual and bereavement Assessment to determine needs that can be supported through [facility].           Signed by: Darlyn Santoro

## 2019-07-08 NOTE — H&P
History and Physical    Patient: Timo Edge MRN: [id number]  SSN: [id number]    YOB: 1934  Age: 80 y.o. Sex: female      Subjective:      Timo Edge is a 80 y.o. female who has a hospice diagnosis of acute on chronic hypoxic respiratory failure. Hospice associated diagnoses are COPD exacerbation, history of tobacco abuse, pulmonary embolism right lower lobe, HCAP, lactic acidosis and acute on chronic left ventricular dysfunction associated CHF. Non-associated diagnoses are hyperthyroidism, Graves disease and rheumatoid arthritis. She was at a facility for rehab when she was sent to the ER at [facility] due to respiratory distress. She was found to have a pulmonary emboli in her right middle and lower lobes that has caused necrosis to the right middle lobe of her lung. She was also found to have healthcare associated pneumonia with WBC 19,000 and lactic acid of 3.5. Lab studies confirmed acute on chronic left ventricular dysfunction associated CHF with . She was treated with IV antibiotics for pneumonia and systemic anticoagulation for pulmonary emboli. She has been weaned off 40L/min high flow oxygen to 6L/min via NC but still has dyspnea at rest and is bedbound. Due to her recent decline, her family has elected to forgo further medical treatment and pursue comfort measures with hospice care. Without further treatment, her life expectancy is less than 7 days. Patient admitted GIP with acute on chronic hypoxic respiratory failure for management of pain, dyspnea and agitation.     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) 9/24/2012    Pain in joint, multiple sites 9/24/2012    Pulmonary emboli (HCC)     Rheumatoid arthritis(714.0) 9/24/2012     Past Surgical History:   Procedure Laterality Date    HX CATARACT REMOVAL Right 07/23/2018    with stent placed in left eye    HX ORTHOPAEDIC  about 20 yr ago    lt ankle    HX OTHER SURGICAL      rectal surgery      Family History   Problem Relation Age of Onset    Heart Disease Mother     Lung Disease Mother     Heart Disease Father     Stroke Father      Social History     Tobacco Use    Smoking status: Former Smoker     Packs/day: 0.20     Years: 20.00     Pack years: 4.00     Types: Cigarettes     Last attempt to quit: 6/9/2004     Years since quitting: 15.0    Smokeless tobacco: Never Used   Substance Use Topics    Alcohol use: No      Prior to Admission medications    Medication Sig Start Date End Date Taking? Authorizing Provider   apixaban (ELIQUIS) 5 mg tablet Take 10 mg by mouth every twelve (12) hours. Yes Provider, Historical   arformoterol (BROVANA) 15 mcg/2 mL nebu neb solution 15 mcg by Nebulization route two (2) times a day. Yes Provider, Historical   budesonide (PULMICORT) 0.5 mg/2 mL nbsp 500 mcg by Nebulization route two (2) times a day. Yes Provider, Historical   docusate sodium (COLACE) 100 mg capsule Take 100 mg by mouth two (2) times a day. Yes Provider, Historical   fluticasone propionate (FLONASE ALLERGY RELIEF) 50 mcg/actuation nasal spray 2 Sprays by Both Nostrils route daily. Yes Provider, Historical   furosemide (LASIX) 20 mg tablet Take 20 mg by mouth daily. Yes Provider, Historical   pantoprazole (PROTONIX) 40 mg tablet Take 40 mg by mouth daily. Yes Provider, Historical   predniSONE (DELTASONE) 10 mg tablet Take 40 mg by mouth daily (with breakfast). Yes Provider, Historical   clonazePAM (KLONOPIN) 0.5 mg tablet Take 0.5 mg by mouth three (3) times daily as needed (anxiety). Yes Provider, Historical   ondansetron (ZOFRAN ODT) 4 mg disintegrating tablet Take 4 mg by mouth every six (6) hours as needed for Nausea.    Yes Provider, Historical   senna (SENNA) 8.6 mg tablet Take 1 Tab by mouth daily as needed for Constipation. Yes Provider, Historical   cetirizine (ZYRTEC) 10 mg tablet Take 10 mg by mouth daily as needed for Allergies. Yes Provider, Historical   calcium-cholecalciferol, D3, (CALCARB 600 WITH VITAMIN D) tablet Take 1 Tab by mouth two (2) times a day. Yes Provider, Historical   methimazole (TAPAZOLE) 10 mg tablet Take 10 mg by mouth daily. Provider, Historical   OXYGEN-AIR DELIVERY SYSTEMS 6 L/min by Nasal route continuous. Provider, Historical        Allergies   Allergen Reactions    Codeine Unknown (comments)     Not sure if intol or allergy  Other reaction(s): Nausea and/or vomiting-Intolerance    Aclidinium Bromide Unknown (comments)     Patient unable to tell me what kind of reaction she had.  Advair Diskus [Fluticasone Propion-Salmeterol] Other (comments)     Diff breathing    Alendronate Other (comments)     Not sure if intol or allergy  Other reaction(s): Rash-Allergy    Amoxicillin-Pot Clavulanate Diarrhea    Avelox [Moxifloxacin] Itching     rash    Boniva [Ibandronate] Other (comments)     Not sure if intol or allergy    Cefzil [Cefprozil] Other (comments)     Not sure if intol or allergy    Daliresp [Roflumilast] Nausea Only    Risedronate Other (comments)     Not sure if intol or allergy  Other reaction(s): Unknown-Unspecified  Patient can't remember    Salmeterol Unknown (comments)    Spiriva With Handihaler [Tiotropium Bromide] Other (comments)     Diff breathing       Review of Systems:  Denies nausea or pain. Dyspnea at rest and dyspnea is increased with conversation.      Objective:     Vitals:    07/08/19 1315 07/09/19 0413 07/09/19 1626   BP: 111/77 126/72 93/60   Pulse: 100 81 93   Resp: 28 20 20   Temp: 96.6 °F (35.9 °C) 95.4 °F (35.2 °C) 96.2 °F (35.7 °C)        Physical Exam:  GENERAL: alert and oriented, cooperative, mild distress, appears stated age, pale, cachectic  LUNG: Coarse diminished breath sounds with labored respirations at rest. Worsening dyspnea with conversation. HEART: regular rate and rhythm  ABDOMEN: soft, non-tender. Bowel sounds normal. No masses,  no organomegaly  EXTREMITIES:  extremities with no cyanosis or edema. + pulses. SKIN: Pale. Warm to touch. NEUROLOGIC: AOx3. Reflexes and motor strength symmetric. Cranial nerves 2-12 and sensation grossly intact. Generalized weakness. Bedbound.    PSYCHIATRIC: anxious    Assessment:     Hospital Problems  Date Reviewed: 7/8/2019          Codes Class Noted POA    * (Principal) Acute on chronic respiratory failure with hypoxia (HCC) ICD-10-CM: J96.21  ICD-9-CM: 518.84, 799.02  7/8/2019 Unknown        Pulmonary emboli (HCC) ICD-10-CM: I26.99  ICD-9-CM: 415.19  7/8/2019 Unknown    Overview Signed 7/8/2019  2:56 PM by LEXX Wu             HCAP (healthcare-associated pneumonia) ICD-10-CM: J18.9  ICD-9-CM: 486  7/8/2019 Unknown        Lactic acidosis ICD-10-CM: E87.2  ICD-9-CM: 276.2  7/8/2019 Unknown        Graves' disease ICD-10-CM: E05.00  ICD-9-CM: 242.00  7/8/2019 Unknown        Acute on chronic combined systolic and diastolic congestive heart failure ([facility] ICD-10-CM: I50.43  ICD-9-CM: 428.43, 428.0  6/7/2019 Unknown        COPD exacerbation ([facility] ICD-10-CM: J44.1  ICD-9-CM: 491.21  8/20/2014 Unknown              Plan:     Current Facility-Administered Medications   Medication Dose Route Frequency    fluticasone propionate (FLONASE) 50 mcg/actuation nasal spray 2 Spray  (Patient Supplied)  2 [place] Both Nostrils DAILY    haloperidol lactate (HALDOL) injection 2 mg  2 mg SubCUTAneous Q1H PRN    acetaminophen (TYLENOL) suppository 650 mg  650 mg Rectal Q3H PRN    bisacodyl (DULCOLAX) suppository 10 mg  10 mg Rectal PRN    haloperidol lactate (HALDOL) injection 2 mg  2 mg SubCUTAneous Q1H PRN    morphine injection 2 mg  2 mg SubCUTAneous Q20MIN PRN    albuterol-ipratropium (DUO-NEB) 2.5 MG-0.5 MG/3 ML  3 mL Nebulization Q4H PRN    predniSONE (DELTASONE) tablet 20 mg  20 mg Oral DAILY WITH BREAKFAST    predniSONE (DELTASONE) tablet 10 mg  10 mg Oral DAILY WITH DINNER    pantoprazole (PROTONIX) tablet 40 mg  40 mg Oral ACB    morphine (ROXANOL) concentrated oral syringe 5 mg  5 mg Oral Q30MIN PRN    Or    morphine (ROXANOL) concentrated oral syringe 5 mg  5 mg SubLINGual Q30MIN PRN    haloperidol (HALDOL) 2 mg/mL oral solution 2 mg  2 mg SubLINGual Q1H PRN    glycopyrrolate (ROBINUL) injection 0.2 mg  0.2 mg SubCUTAneous Q4H PRN    loratadine (CLARITIN) tablet 10 mg  10 mg Oral DAILY PRN    clonazePAM (KlonoPIN) tablet 0.5 mg  0.5 mg Oral TID PRN    methIMAzole (TAPAZOLE) tablet 5 mg (Patient Supplied)  5 mg Oral DAILY    ondansetron (ZOFRAN ODT) tablet 4 mg  4 mg Oral Q6H PRN       7[facility] Ad[facility] G[facility] with acute on chronic hypoxic respiratory failure for management of pain, dyspnea and anxiety/agitation. 1. Pain: Morphine 5mg po Q30 minutes prn or 2mg IV/SQ Q20 minutes as needed. Prednisone bid. 2. Dyspnea: Morphine 5mg po Q30 minutes prn or  2mg IV/SQ Q20 minutes as needed. Duonebs q4 prn. Glycopyrrolate 0.2mg q4 prn secretions. Oxygen at  6L/min prn. Prednisone BID. 3. AnxietyAgitation: Clonazepam 0.5mg Q4 hours as needed. Haloperidol 2mg PO/IV/SQ Q1 hour prn.    4. Family/Pt Support: Family at bedside during exam. Medications and plan of care discussed with nursing staff and family. Will continue to monitor for symptoms and adjust medications as needed to maintain patient comfort. PPS 30%. Case discussed with Dr. Earnest Simpson. Restart Methimazole brought in today from home. Patient has chosen not to continue anticoagulation to treat PE.     Signed By: Elmer Maxwell NP     July 9, 2019

## 2019-07-09 NOTE — HSPC IDG SOCIAL WORKER NOTES
Patient: George Ramirez    Date: 07/09/19  Time: 8:22 AM    [facility] [place]  Notes  LMSW has reviewed the initial Rn Comprehensive assessment and plan of care. Acknowledge there is no immediate needs for SW.        Signed by: Agustin Hernandez

## 2019-07-09 NOTE — PROGRESS NOTES
Problem: Anticipatory Grief  Goal: Grief heard and acknowledged, anxiety reduced, patient coping identified, patient/family expressed gratitude  Description  Anxiety identified around patient's  having health issues. Outcome: Progressing Towards Goal     Problem: Spiritual Evaluation  Goal: Identify beliefs/practices that support hospice experience  Description  Patient/family identify their beliefs/practices that impair Hospice experience. Patient/family identify their beliefs/practices that support Hospice experience. Patient coping identified. Spiritual distress identified and decreased with visit. Restorationist Vanessa Background Identified. Good pastoral support from the [employer]. No spiritual distress identified at this point. Nothing identified at this point that will hinder the hospice experience.     Outcome: Progressing Towards Goal

## 2019-07-09 NOTE — PROGRESS NOTES
Circumstances for Admission: Pt was admitted to [facility] from [facility] on 7/8/19. She has a hospice diagnosis of Acute on chronic respiratory Failure. She was transported to us via ambulance to room 108. Pt is awake and alert and able to make her needs known. This is an order to admit Ed Arciniega to inpatient hospice care at [facility], for a first 90 day benefit interval.  She is an 24-year-old woman admitted to [facility] last week with hypoxic respiratory failure due to several active causes. The patient sustained pulmonary embolism to the right lower and right middle lobes of severity sufficient to trigger necrosis within the right middle lobe. There was concomitant community-acquired pneumonia with white blood cell count rising to 19,000 and lactic acid rising tom3.5. Concomitant acute on chronic left ventricular dysfunction associated CHF and Cor Pulmonale refracted on echocardiogram while BNP was 236. Patient has  a long history of tobacco abuse and chronic obstructive pulmonary disease. She was treated with broad-spectrum IV antibiotics and systemic anticoagulation. While her FiO2 requirement has decreased from high flow nasal cannula 40 L/m to 4 L/m via nasal cannula, the patient's functional status remains extraordinarily compromised with persistence of dyspnea at rest.  The patient is requiring frequent doses of parenteral opioid for chest pain and dyspnea. The patient's family members (her son Mark Chatterjee as [place]) have elected with her consent to stop life extending interventions and to limit further care to comfort measures only. C. Difficile negative. 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 Her life expectancy under comfort care is less than 1 week.   Graves' disease with controlled hyperthyroidism isn't unrelated diagnoses and further compromising this octogenarian woman's prognosis. Patient-Family/Social History:  Pt is a 80year old  woman. She has been  to Humberto for 79 years. She has been his caregiver. He is in ill health. With recent surgeries to his back. Pt and Humberto have one son Uma Livan. The pt never served in the [employer]. She did work at [employer] for over 40 years. She has a good support system even if it may be small. Payor Source: [employer] / History: Pt and family have no financial needs       Spritiual Needs:  Pt attends [employer] in [place]. Rk Daniel has been made aware that pt is here. Home Environment: Pt lived in her home with her spouse. He is in bad health and she had been caring for both of them,. Her spouse now lives with her son and daughter and law. Patient's Emotional and Cognitive Status: Pt is awake and alert and is able to hold a conversation. She is A& O x 3       Primary Caregiver and Resources used/needed at home Pt has no other caregivers than her only son and daughter in law who is already caring for her spouse. Advance Directive/HCPOA / DNR Status: Pt does no t have a HCPOA on file,  Pt is a DNR    Final Arrangements: did not discuss she had lots of company in the room. Bereavement Risk: Low, normal grief expected,.        Discharge Planning: Possible DC with the in home program.      Referrals Made:       __________  Volunteer  _____x___  Bereavement ________  [employer] ______  [place] information _______

## 2019-07-09 NOTE — [facility] IDG MASTER NOTE
Hospice Interdisciplinary Group Collaborative  Date: 07/09/19  Time: 8:24 AM    ___________________    Patient: Derek Nava  Coverage Information:     Payor: [place] MEDICARE     Plan: [facility] MEDICARE     Subscriber ID: [id number]     Phone Number:   MRN: [id number]    Hospice Election Date: 7/8/19  Current Benefit Period: Benefit Period 1  Start Date: 7/8/2019  End Date: 10/5/2019      Medical Director: Vivian Wolfe MD  Hospice Attending Provider: No Hospice attending provider. Level of Care: General Inpatient Care      ___________________    Diagnoses: There were no encounter diagnoses.     Current Medications:    Current Facility-Administered Medications:     haloperidol lactate (HALDOL) injection 2 mg, 2 mg, SubCUTAneous, Q1H PRN **OR** [DISCONTINUED] haloperidol lactate (HALDOL) injection 2 mg, 2 mg, IntraVENous, Q1H PRN, Kennedi Span, NP    acetaminophen (TYLENOL) suppository 650 mg, 650 mg, Rectal, Q3H PRN, Kennedi Span, NP    bisacodyl (DULCOLAX) suppository 10 mg, 10 mg, Rectal, PRN, Kennedi Span, NP    haloperidol lactate (HALDOL) injection 2 mg, 2 mg, SubCUTAneous, Q1H PRN **OR** [DISCONTINUED] haloperidol lactate (HALDOL) injection 2 mg, 2 mg, IntraVENous, Q1H PRN, Kennedi Span, NP    morphine injection 2 mg, 2 mg, SubCUTAneous, Q20MIN PRN **OR** [DISCONTINUED] morphine injection 2 mg, 2 mg, IntraVENous, Q20MIN PRN, Kennedi Span, NP    albuterol-ipratropium (DUO-NEB) 2.5 MG-0.5 MG/3 ML, 3 mL, Nebulization, Q4H PRN, Kennediluis alfredo Gonzalez, NP, 3 mL at 07/08/19 1938    predniSONE (DELTASONE) tablet 20 mg, 20 mg, Oral, DAILY WITH BREAKFAST, Marzolf, Glen Graft, NP    predniSONE (DELTASONE) tablet 10 mg, 10 mg, Oral, DAILY WITH DINNER, Kennedi Span, NP, 10 mg at 07/08/19 1700    pantoprazole (PROTONIX) tablet 40 mg, 40 mg, Oral, ACB, Marzolf, Glen Graft, NP    morphine (ROXANOL) concentrated oral syringe 5 mg, 5 mg, Oral, Q30MIN PRN **OR** morphine (ROXANOL) concentrated oral syringe 5 mg, 5 mg, SubLINGual, Q30MIN PRN, Tilden Babinski, NP    haloperidol (HALDOL) 2 mg/mL oral solution 2 mg, 2 mg, SubLINGual, Q1H PRN, Tilden Babinski, NP    glycopyrrolate (ROBINUL) injection 0.2 mg, 0.2 mg, SubCUTAneous, Q4H PRN, Tilden Babinski, NP    loratadine (CLARITIN) tablet 10 mg, 10 mg, Oral, DAILY PRN, Tilden Babinski, NP    clonazePAM (KlonoPIN) tablet 0.5 mg, 0.5 mg, Oral, TID PRN, Tilden Babinski, NP, 0.5 mg at 07/08/19 2003    methIMAzole (TAPAZOLE) tablet 5 mg (Patient Supplied), 5 mg, Oral, DAILY, Tilden Babinski, NP    ondansetron (ZOFRAN ODT) tablet 4 mg, 4 mg, Oral, Q6H PRN, Tilden Babinski, NP    Orders:  Orders Placed This Encounter    IP CONSULT TO SPIRITUAL CARE Once on week one, then PRN. For [facility] patients only. For contracted patients, primary hospice will continue to manage spiritual care needs     Once on week one, then PRN. For [facility] patients only. For contracted patients, primary hospice will continue to manage spiritual care needs     Standing Status:   Standing     Number of Occurrences:   1     Order Specific Question:   Reason for Consult: Answer:   Spiritual crisis intervention or per patient or caregiver request    DIET MECHANICAL SOFT     Grits, eggs, and black coffee for breakfast. Water (no tea) with all meals. LOVES baked sweet potatoes. Ground meats please. Standing Status:   Standing     Number of Occurrences:   1    VITAL SIGNS     Standing Status:   Standing     Number of Occurrences:   1    VITAL SIGNS     Standing Status:   Standing     Number of Occurrences:   1    NURSING-MISCELLANEOUS: comfort measures Enter comfort measures above. CONTINUOUS     Enter comfort measures above. Standing Status:   Standing     Number of Occurrences:   1     Order Specific Question:   Description of Order:     Answer:   comfort measures    SUTTON CATHETER, CARE     1. Sutton Catheter care every shift and PRN  2.  Notify Physician of Sutton Catheter leakage, occlusion, gross adherent sediment or accidental removal  3. Change [place] 30 days after insertion. 4. May flush catheter prn leakage or gross adherent sediment or mucus. Standing Status:   Standing     Number of Occurrences:   1    BLADDER CHECKS     May scan bladder PRN for urinary retention and or patient discomfort     Standing Status:   Standing     Number of Occurrences:   1    NURSING ASSESSMENT:  SPECIFY Assess for GIP, routine, or respite level of care. Q SHIFT Routine     Standing Status:   Standing     Number of Occurrences:   1     Order Specific Question:   Please describe the test or procedure you would like to order. Answer:   Assess for GIP, routine, or respite level of care.  PAIN ASSESSMENT Pain and Symptoms: Assess ever 4 hours and PRN, for GIP level of care. PRN Routine     Standing Status:   Standing     Number of Occurrences:   1     Order Specific Question:   Please describe the test or procedure you would like to order. Answer:   Pain and Symptoms: Assess ever 4 hours and PRN, for GIP level of care.  BEDREST, COMPLETE     Standing Status:   Standing     Number of Occurrences:   1    NURSING-MISCELLANEOUS: [facility] with acute on chronic hypoxic respiratory failure for management of pain, dyspnea and agitation. This is an order to admit Poncho Oliver to inpatient hospice care at [address] [place], [place] [address]y 60... [facility]8: [facility] Ad[facility] G[facility] with acute on chronic hypoxic respiratory failure for management of pain, dyspnea and agitation. This is an order to admit Poncho Oliver to inpatient hospice care at [facility], for a first 90 day benefit interval.  She is an 17-year-old woman admitted to [facility] last week with hypoxic respiratory failure due to several active causes.   The patient sustained pulmonary embolism to the right lower and right middle lobes of severity sufficient to trigger necrosis within the right middle lobe. There was concomitant community-acquired pneumonia with white blood cell count rising to 19,000 and lactic acid rising tom3.5. Concomitant acute on chronic left ventricular dysfunction associated CHF and Cor Pulmonale refracted on echocardiogram while BNP was 236. Patient has  a long history of tobacco abuse and chronic obstructive pulmonary disease. She was treated with broad-spectrum IV antibiotics and systemic anticoagulation. While her FiO2 requirement has decreased from high flow nasal cannula 40 L/m to 4 L/m via nasal cannula, the patient's functional status remains extraordinarily compromised with persistence of dyspnea at rest.  The patient is requiring frequent doses of parenteral opioid for chest pain and dyspnea. The patient's family members (her son Ligia Rowley as [place]) have elected with her consent to stop life extending interventions and to limit further care to comfort measures only. C. Difficile negative. 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 Her life expectancy under comfort care is less than 1 week. Graves' disease with controlled hyperthyroidism isn't unrelated diagnoses and further compromising this octogenarian woman's prognosis. Standing Status:   Standing     Number of Occurrences:   1     Order Specific Question:   Description of Order:     Answer:   admit    DO NOT RESUSCITATE     Standing Status:   Standing     Number of Occurrences:   1    OXYGEN HIGH FLOW     Standing Status:   Standing     Number of Occurrences:   1     Order Specific Question:   Liters per minute: Answer:   6     Order Specific Question:   Indications for O2 therapy     Answer:   RESPIRATORY DISTRESS     Order Specific Question:   Oxygen Heated?      Answer:   No    DISCONTD: sodium chloride (NS) flush 3 mL    DISCONTD: sodium chloride (NS) flush 3 mL    haloperidol lactate (HALDOL) injection 2 mg    DISCONTD: haloperidol lactate (HALDOL) injection 2 mg    acetaminophen (TYLENOL) suppository 650 mg    bisacodyl (DULCOLAX) suppository 10 mg    haloperidol lactate (HALDOL) injection 2 mg    DISCONTD: haloperidol lactate (HALDOL) injection 2 mg    DISCONTD: glycopyrrolate (ROBINUL) injection 0.2 mg    morphine injection 2 mg    DISCONTD: morphine injection 2 mg    albuterol-ipratropium (DUO-NEB) 2.5 MG-0.5 MG/3 ML     Order Specific Question:   MODE OF DELIVERY     Answer:   Nebulizer    predniSONE (DELTASONE) tablet 20 mg    predniSONE (DELTASONE) tablet 10 mg    pantoprazole (PROTONIX) tablet 40 mg     Order Specific Question:   PPI INDICATION     Answer:   SUP Prophylaxis    OR Linked Order Group     morphine (ROXANOL) concentrated oral syringe 5 mg     morphine (ROXANOL) concentrated oral syringe 5 mg    haloperidol (HALDOL) 2 mg/mL oral solution 2 mg    apixaban (ELIQUIS) 5 mg tablet     Sig: Take 10 mg by mouth every twelve (12) hours.  arformoterol (BROVANA) 15 mcg/2 mL nebu neb solution     Sig: 15 mcg by Nebulization route two (2) times a day.  budesonide (PULMICORT) 0.5 mg/2 mL nbsp     Si mcg by Nebulization route two (2) times a day.  docusate sodium (COLACE) 100 mg capsule     Sig: Take 100 mg by mouth two (2) times a day.  fluticasone propionate (FLONASE ALLERGY RELIEF) 50 mcg/actuation nasal spray     Si Sprays by Both Nostrils route daily.  furosemide (LASIX) 20 mg tablet     Sig: Take 20 mg by mouth daily.  pantoprazole (PROTONIX) 40 mg tablet     Sig: Take 40 mg by mouth daily.  predniSONE (DELTASONE) 10 mg tablet     Sig: Take 40 mg by mouth daily (with breakfast).  clonazePAM (KLONOPIN) 0.5 mg tablet     Sig: Take 0.5 mg by mouth three (3) times daily as needed (anxiety).  ondansetron (ZOFRAN ODT) 4 mg disintegrating tablet     Sig: Take 4 mg by mouth every six (6) hours as needed for Nausea.     senna (SENNA) 8.6 mg tablet     Sig: Take 1 Tab by mouth daily as needed for Constipation.  cetirizine (ZYRTEC) 10 mg tablet     Sig: Take 10 mg by mouth daily as needed for Allergies.  glycopyrrolate (ROBINUL) injection 0.2 mg    loratadine (CLARITIN) tablet 10 mg    clonazePAM (KlonoPIN) tablet 0.5 mg     OP SIG:Take 0.5 mg by mouth three (3) times daily as needed (anxiety).  methIMAzole (TAPAZOLE) tablet 5 mg (Patient Supplied)     OP SIG:Take 10 mg by mouth daily.  ondansetron (ZOFRAN ODT) tablet 4 mg     OP SIG:Take 4 mg by mouth every six (6) hours as needed for Nausea.  INITIAL PHYSICIAN ORDER: HOSPICE Level Of Care: General Inpatient; Reason for Admission: [facility] Ad[facility] with acute on chronic hypoxic respiratory failure for management of pain, dyspnea and agitation. Standing Status:   Standing     Number of Occurrences:   1     Order Specific Question:   Status     Answer:   Hospice     Order Specific Question:   Level Of Care     Answer:   General Inpatient     Order Specific Question:   Reason for Admission     Answer:   7[facility]8: [facility] Ad[facility] with acute on chronic hypoxic respiratory failure for management of pain, dyspnea and agitation. Order Specific Question:   Inpatient Hospitalization Certified Necessary for the Following Reasons     Answer:   3.  Patient receiving treatment that can only be provided in an inpatient setting (further clarification in H&P documentation)     Order Specific Question:   Admitting Diagnosis     Answer:   Acute on chronic respiratory failure with hypoxia [facility]) [[id number]]     Order Specific Question:   Terminal Prognosis Diagnosis(es)     Answer:   Acute on chronic respiratory failure with hypoxia [facility]) [[id number]]     Order Specific Question:   Admitting Physician     Answer:   Reta Diaz [[id number]]     Order Specific Question:   Attending Physician     Answer:   Lali Wilcox     Order Specific Question:   Discharge Plan: Answer: Other (Specify)    IP CONSULT TO SOCIAL WORK     Once on week one, then PRN for Psychosocial crisis intervention or per patient or caregiver request.  For [facility] patients only. For contracted patients, primary hospice will continue to manage social work needs. Standing Status:   Standing     Number of Occurrences:   1     Order Specific Question:   Reason for Consult: Answer: Once on week one, then PRN for Psychosocial crisis intervention or per patient or caregiver request.       Allergies: Allergies   Allergen Reactions    Codeine Unknown (comments)     Not sure if intol or allergy  Other reaction(s): Nausea and/or vomiting-Intolerance    Aclidinium Bromide Unknown (comments)     Patient unable to tell me what kind of reaction she had.      Advair Diskus [Fluticasone Propion-Salmeterol] Other (comments)     Diff breathing    Alendronate Other (comments)     Not sure if intol or allergy  Other reaction(s): Rash-Allergy    Amoxicillin-Pot Clavulanate Diarrhea    Avelox [Moxifloxacin] Itching     rash    Boniva [Ibandronate] Other (comments)     Not sure if intol or allergy    Cefzil [Cefprozil] Other (comments)     Not sure if intol or allergy    Daliresp [Roflumilast] Nausea Only    Risedronate Other (comments)     Not sure if intol or allergy  Other reaction(s): Unknown-Unspecified  Patient can't remember    Salmeterol Unknown (comments)    Spiriva With Handihaler [Tiotropium Bromide] Other (comments)     Diff breathing       Care Plan:  Multidisciplinary Problems (Active)     Problem: Aide Supervisory Visit     Dates: Start: 07/08/19       Description:     Disciplines: Interdisciplinary    Goal: Supervision of Home Health Aide     Dates: Start: 07/08/19   Expected End: 07/22/19       Description:     Disciplines: Interdisciplinary    Intervention: Aide supervisory visit     Dates: Start: 07/08/19       Description:                       Problem: Anticipatory Grief Dates: Start: 07/08/19       Description:     Disciplines: Interdisciplinary    Goal: Explore reactions to and verbalize acceptance of impending loss     Dates: Start: 07/08/19   Expected End: 07/22/19       Description: Patient/family/caregiver will explore reactions to and verbalize acceptance of impending loss. Disciplines: Interdisciplinary    Intervention: Assess grief responses     Dates: Start: 07/08/19       Description:           Intervention: Assist with grief counseling     Dates: Start: 07/08/19       Description:  to assist.          Intervention: Carlotta Mantilla on stages of grief     Dates: Start: 07/08/19       Description: Instruct patient/caregiver on stages of grief. Intervention: Offer grief support group     Dates: Start: 07/08/19       Description: Patient/family/caregiver will explore reactions to and verbalize acceptance of impending loss. Problem: Coping and Emotional Distress     Dates: Start: 07/08/19       Description:     Disciplines: Interdisciplinary    Goal: Demonstrate acceptance of terminal illness and understanding of disease progression     Dates: Start: 07/08/19   Expected End: 07/22/19       Description: Patient/family/caregiver will demonstrate acceptance of terminal disease and understanding of disease progression while employing appropriate coping mechanisms. Disciplines: Interdisciplinary    Intervention: Assess for alteration in coping     Dates: Start: 07/08/19       Description:           Intervention: Assess for signs/symptoms of emotional distress     Dates: Start: 07/08/19       Description:           Intervention: Instruct on effective coping skills     Dates: Start: 07/08/19       Description: Instruct patient/caregiver on effective coping skills.           Intervention: Instruct on strategies to reduce emotional distress     Dates: Start: 07/08/19       Description: Instruct patient/caregiver on strategies to reduce emotional distress. Problem: Dyspnea Due to End of Life     Dates: Start: 07/08/19       Description: Pt will remain free from dyspnea aeb no shortness of breath while at [address] [facility]. Disciplines: Interdisciplinary    Goal: Demonstrate understanding of and ability to manage respiratory symptoms at end of life     Dates: Start: 07/08/19   Expected End: 07/10/19       Description:     Disciplines: Interdisciplinary    Intervention: Assess for signs and symptoms of dyspnea     Dates: Start: 07/08/19       Description:           Intervention: Instruct on causes/symptoms of dyspnea     Dates: Start: 07/08/19       Description:           Intervention: Ira Rowe patient/caregiver/family on strategies to effectively manage dyspnea     Dates: Start: 07/08/19       Description: Georgeo catalinas  prednisone  morphine                       Problem: Falls - Risk of     Dates: Start: 07/08/19       Description:     Disciplines: Interdisciplinary    Goal: *Absence of Falls     Dates: Start: 07/08/19   Expected End: 07/22/19       Description: Pt will remain free from falls while at [address] [place] [address] aeb no injuries. Disciplines: Interdisciplinary                Problem: Pressure Injury - Risk of     Dates: Start: 07/08/19       Description: Pt will remain free from pressure sores while at [address] [facility] aeb no breaks in the skin. Disciplines: Interdisciplinary    Goal: *Prevention of pressure injury     Dates: Start: 07/08/19   Expected End: 07/22/19       Description: Document Juan Scale and appropriate interventions in the flowsheet.     Disciplines: Interdisciplinary                Problem: Spiritual Distress     Dates: Start: 07/08/19       Description:     Disciplines: Interdisciplinary    Goal: Distress heard, acknowledged, and addressed     Dates: Start: 07/08/19   Expected End: 07/22/19       Description: Patient/family/caregiver distress will be heard, acknowledged, and addressed throughout hospice care. Disciplines: Interdisciplinary    Intervention: Assess for signs/symptoms of spiritual distress     Dates: Start: 07/08/19       Description:           Intervention: Consult on spiritual care     Dates: Start: 07/08/19       Description: Consult to Spiritual Care          Intervention: Discuss strategies to reduce spiritual distress     Dates: Start: 07/08/19       Description: Discuss with patient/caregiver strategies to reduce spiritual distress.                         Care Plan Problems/Goals      Progressing Towards Goal (7)     *Absence of Falls     Problem: Falls - Risk of    Disciplines: Interdisciplinary    Expected end:  07/22/19     Progressing Towards Goal By Gill Hummel on 07/09/19 0636               *Prevention of pressure injury     Problem: Pressure Injury - Risk of    Disciplines: Interdisciplinary    Expected end:  07/22/19     Progressing Towards Goal By Gill Hummel on 07/09/19 0636               Supervision of Home Health Aide     Problem: Aide Supervisory Visit    Disciplines: Interdisciplinary    Expected end:  07/22/19     Progressing Towards Goal By Gratn Emmanuel RN on 07/08/19 1515               Explore reactions to and verbalize acceptance of impending loss     Problem: Anticipatory Grief    Disciplines: Interdisciplinary    Expected end:  07/22/19     Progressing Towards Goal By Grant Emmanuel RN on 07/08/19 1515               Demonstrate acceptance of terminal illness and understanding of disease progression     Problem: Coping and Emotional Distress    Disciplines: Interdisciplinary    Expected end:  07/22/19     Progressing Towards Goal By Grant Emmanuel RN on 07/08/19 1515               Distress heard, acknowledged, and addressed     Problem: Spiritual Distress    Disciplines: Interdisciplinary    Expected end:  07/22/19     Progressing Towards Goal By Grant Emmanuel RN on 07/08/19 1515               Demonstrate understanding of and ability to manage respiratory symptoms at end of life     Problem: Dyspnea Due to End of Life    Disciplines: Interdisciplinary    Expected end:  07/10/19     Progressing Towards Goal By Alphonse Ko on 07/09/19 0636                           ___________________    Care Team Notes          POC/IDG Notes      [facility] IDG  Notes by Vini Harmon at 07/09/19 0537  Version 1 of 1    Author:  Vini Harmon Service:  Licensed Clinical  Author Type:      Filed:  07/09/19 0822 Date of Service:  07/09/19 5275 Status:  Signed    :  Vini Harmon ()       Patient: Nora Walsh    Date: 07/09/19  Time: 8:22 AM    [facility]  Notes  LMSW has reviewed the initial Rn Comprehensive assessment and plan of care. Acknowledge there is no immediate needs for SW. Signed by: Dejuan Goyal       [facility] IDG Volunteer Notes by Shantanu Shen at 07/08/19 1944  Version 1 of 1    Author:  Shantanu Shen Service:  Barbara Alcaraz Author Type:  Hospice Volunteer/    Filed:  07/08/19 1944 Date of Service:  07/08/19 1944 Status:  Signed    :  Shantanu Shen (Hospice Volunteer/)       Patient: Nora Walsh    Date: 07/08/19  Time: 7:44 PM    [facility] Volunteer Notes  VC has reviewed the initial RN Comprehensive assessment and plan of care. Acknowledge there are no immediate needs for volunteers.             Signed by: Jennifer Baer IDG Nurse Notes by Rolando Soulier, RN at 07/08/19 1528  Version 5 of 5    Author:  Rolando Soulier, RN Service:  NURSING Author Type:  Registered Nurse    Filed:  07/08/19 1906 Date of Service:  07/08/19 1528 Status:  Addendum    :  Rolando Soulier, RN (Registered Nurse)    Related Notes:  Original Note by Rolando Soulier, RN (Registered Nurse) filed at 07/08/19 1712       Patient: Nora Walsh    Date: 07/08/19  Time: 3:28 PM    [address] [place] [address] Nurse Notes  Nora Walsh 79 y/o female admitted gip for respiratory failure. Symptom management of pain/dyspnea/agitation  Pt arrived via ems on 6 liters nasal canula per Alexandru rn. Pt alert and oriented X3. Pt states she get short of breath with exertion. Pt has no shortness of breath while i'm talking to her but does state that she took something for anxiety before her ambulance ride over. Pt lungs coarse. Pt has hypoactive bowel sounds. She is continent of bowel and bladder. Assist with turns X1. Pt has some discoloration to BLE. Family states that she is a feeder. That she lacks the endurance to feed herself. Pt is total care for all adl's. Admission has been completed and inpatient [facility] care plan initiated including bereavement, spiritual, and psychosocial. IDG team made aware of plan of care and immediate needs. ?  1711 Pt po meds given. Pt eating dinner, no complaints. Pt denies pain, agitation or sob. 1905 report given to shirin mcginnis            Signed by: Tha Carranza RN       [facility] IDG Nurse Notes by Denyse Goldmann, RN at 07/08/19 1528  Version 4 of 5    Author:  Denyse Goldmann, RN Service:  NURSING Author Type:  Registered Nurse    Filed:  07/08/19 1712 Date of Service:  07/08/19 1528 Status:  Addendum    :  Denyse Goldmann, RN (Registered Nurse)    Related Notes:  Addendum by Denyse Goldmann, RN (Registered Nurse) filed at 07/08/19 1906  Original Note by Denyse Goldmann, RN (Registered Nurse) filed at 07/08/19 1551       Patient: Isamar Avery    Date: 07/08/19  Time: 3:28 PM    [facility] Nurse Notes  Isamar Avery 79 y/o female admitted gip for respiratory failure. Symptom management of pain/dyspnea/agitation  Pt arrived via ems on 6 liters nasal canula per Alexandru rn. Pt alert and oriented X3. Pt states she get short of breath with exertion. Pt has no shortness of breath while i'm talking to her but does state that she took something for anxiety before her ambulance ride over. Pt lungs coarse.  Pt has hypoactive bowel sounds. She is continent of bowel and bladder. Assist with turns X1. Pt has some discoloration to BLE. Family states that she is a feeder. That she lacks the endurance to feed herself. Pt is total care for all adl's. Admission has been completed and inpatient [facility] care plan initiated including bereavement, spiritual, and psychosocial. IDG team made aware of plan of care and immediate needs. ?  1711 Pt po meds given. Pt eating dinner, no complaints. Pt denies pain, agitation or sob. Signed by: Eduardo Tran RN       [facility] IDG Nurse Notes by Brennen Abdullahi RN at 07/08/19 1528  Version 3 of 5    Author:  Brennen Abdullahi RN Service:  NURSING Author Type:  Registered Nurse    Filed:  07/08/19 1551 Date of Service:  07/08/19 1528 Status:  Addendum    :  Brennen Abdullahi RN (Registered Nurse)    Related Notes:  Addendum by Brennen Abdullahi RN (Registered Nurse) filed at 07/08/19 1712  Original Note by Brennen Abdullahi RN (Registered Nurse) filed at 07/08/19 1550       Patient: Sujata Pemberton    Date: 07/08/19  Time: 3:28 PM    [facility] Nurse Notes  Sujata Pemberton 79 y/o female admitted gip for respiratory failure. Symptom management of pain/dyspnea/agitation  Pt arrived via ems on 6 liters nasal canula per Alexandru smith. Pt alert and oriented X3. Pt states she get short of breath with exertion. Pt has no shortness of breath while i'm talking to her but does state that she took something for anxiety before her ambulance ride over. Pt lungs coarse. Pt has hypoactive bowel sounds. She is continent of bowel and bladder. Assist with turns X1. Pt has some discoloration to BLE. Family states that she is a feeder. That she lacks the endurance to feed herself. Pt is total care for all adl's. Admission has been completed and inpatient GIP care plan initiated including bereavement, spiritual, and psychosocial. IDG team made aware of plan of care and immediate needs. ?            Signed by:  Eduardo Tran RN [facility] IDG Nurse Notes by Ny Humphreys RN at 07/08/19 1528  Version 2 of 5    Author:  Ny Humphreys RN Service:  NURSING Author Type:  Registered Nurse    Filed:  07/08/19 1550 Date of Service:  07/08/19 1528 Status:  Addendum    :  Ny Humphreys RN (Registered Nurse)    Related Notes:  Addendum by Ny Humphreys RN (Registered Nurse) filed at 07/08/19 1551  Original Note by Ny Humphreys RN (Registered Nurse) filed at 07/08/19 1539       Patient: Oscar Huber    Date: 07/08/19  Time: 3:28 PM    [facility] Nurse Notes  Oscar Huber 79 y/o female admitted gip for respiratory failure. Symptom management of pain/dyspnea/agitation  Pt arrived via ems on 6 liters nasal canula per Alexandru rn. Pt alert and oriented X3. Pt states she get short of breath with exertion. Pt has no shortness of breath while i'm talking to her but does state that she took something for anxiety before her ambulance ride over. Pt lungs coarse. Pt has hypoactive bowel sounds. She is continent of bowel and bladder. Assist with turns X1. Pt has some discoloration to BLE. Family states that she is a feeder. That she lacks the endurance to feed herself. Pt is total care for all adl's. Admission has been completed and inpatient GIP care plan initiated including bereavement, spiritual, and psychosocial. IDG team made aware of plan of care and immediate needs. ?            Signed by: Meghan Cooney RN       [facility] IDG Nurse Notes by Ny Humphreys RN at 07/08/19 1528  Version 1 of 5    Author:  Ny Humphreys RN Service:  NURSING Author Type:  Registered Nurse    Filed:  07/08/19 1539 Date of Service:  07/08/19 1528 Status:  Signed    :  Ny Humphreys RN (Registered Nurse)    Related Notes:  Addendum by Ny Humphreys RN (Registered Nurse) filed at 07/08/19 1550       Patient: Oscar Huber    Date: 07/08/19  Time: 3:28 PM    [facility] Nurse Notes  Pt arrived via ems on 6 liters nasal canula per Alexandru rn.  Pt alert and oriented X3. Pt states she get short of breath with exertion. Pt has no shortness of breath while i'm talking to her but does state that she took something for anxiety before her ambulance ride over. Pt lungs coarse. Pt has hypoactive bowel sounds. She is continent of bowel and bladder. Assist with turns X1. Pt has some discoloration to BLE. Family states that she is a feeder. That she lacks the endurance to feed herself. Pt is total care for all adl's. Signed by: Tena Sunshine RN       [address] [facility]  Notes by Danyel Green at 07/08/19 1659  Version 1 of 1    Author:  Danyel Green Service:  Spiritual Care Author Type:  Pastoral Care    Filed:  07/08/19 1700 Date of Service:  07/08/19 1659 Status:  Signed    :  Danyel Green (Pastoral Care)       Patient: Marylen Savage    Date: 07/08/19  Time: 4:59 PM    [facility]  Notes   has reviewed  Initial comprehensive Assessment and Initial Plan of Care for Marylen Savage.  is in agreement with the plans put in place and goals set thus far.  will be making a Spiritual and bereavement Assessment to determine needs that can be supported through [facility]. Signed by: Josseline Clifton       [address] [facility] Other Notes by Frances Juarez at 07/08/19 1605  Version 1 of 1    Author:  Frances Juarez Service:  HOSPICE Author Type:  Pastoral Care    Filed:  07/08/19 1606 Date of Service:  07/08/19 1605 Status:  Signed    :  Frances Juarez (Pastoral Care)       Bereavement Coordinator has reviewed RN Initial Comprehensive Assessment and reviewed Care Plan. I acknowledge no urgent Bereavement Care needs identified at time of admission.                 Care Team Present:   Team Members Present: Physician, Nurse, , , Bereavement, Vol Coordinator  Physician Team Member: Dr. Francisco Lucero MD  Case Management Team Member: JASON العلي  Nurse Team Member: Willy Saldana RN  Social Work Team Member: Galina Messer, [address] [place] Team Member: Roopa Elder.  Div  Vol Coordinator: Agnes Iverson  Other Discipline Present (Name): Bala Roa

## 2019-07-09 NOTE — PROGRESS NOTES
Bereavement:    Mr. Devan Gomez has limited mobility. He has several health issues. His wife has been caring for him and now she is our hospice patient. Referral has been made for pre bereavement to Bartolo Cabrera M.Div. They have one son, Brenda Barr who is very supportive. He has been staying with his mother at night.

## 2019-07-09 NOTE — PROGRESS NOTES
Situation:  Mrs. Imer Haynes is an 80year old female who comes to us from [facility] with a diagnosis of Respiratory Failure. She is GIP level of care for symptom management. Her symptoms are pain, dyspnea and agitation. Background:  Imer Haynes is . Her  Violeta Dominique  recently had surgery. He has several health issues. He requires a walker for mobility. He no longer drives. His ability to be with his wife is limited.  And Mrs. Quynh Zavala have one son, Judit Miller, a daughter in law, 3 grandchildren and 5 great grandchildren. Mrs. Quynh Zavala is one of 7 children. 3 of her siblings are . Mrs. Quynh Zavala has a twin sister, Yulia Page. Her sister Amie Gomes was with her when  visited this morning. Her sister Leena Friedman will more than likely be able to come visit. Mrs. Quynh Zavala retired from [address] [place] [address] after working 40 plus years as a  in the office. Mrs. Quynh Zavala is a Y[employer]. She attends [employer] on [address] [place] [address] in [place]. Her  Meredith Self is involved in her spiritual care. Assessment: Mrs. Quynh Zavala states she is at peace with whatever her future holds. She says she is ready to go if God calls her home. She and her  have health issues. She has been trying to care for him in the midst of her own struggles. She says her son Judit Miller has been very supportive. He has been staying with her at night. Her sister Majo Garcia spoke of their upbringing and being taught to work hard, live right, go to [employer] and obey God. 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  affirmed the hard work as well as the good times experienced as people gathered to help each other in the gardens.  encouraged Mrs. Quynh Zavala to embrace life and 'squeeze\" every ounce of enjoyment she can.  spoke of God's purpose and how even in times when our bodies are failing we still have purpose.  encouraged patient and her sister to continue to share end enjoy each other.  assured them both of continued support throughout Mrs. Tavera's stay with [facility].  offered prayer. Response: both patient and her sister expressed their love of each other and their juan carlos in God. They expressed appreciation for 's visit and support. Plan:  will continue to provide spiritual and emotional support throughout patient's time with [facility]. See Care Plan  In light of patient's 's condition a referral to bereavement will be place for pre bereavement.

## 2019-07-09 NOTE — PROGRESS NOTES
Bedside Report taken from shirin mcginnis RN. Pt identified. Pt in bed with eyes closed; displaying no signs or symptoms of pain, dyspnea, agitation,seizures, nausea, or vomiting. FLACC 0. Bed locked and low, side rails up, tabs/bed alarm in place for pt. safety. Call light with in reach, and door opened for continued monitoring. Care plan reviewed with Cna.     3055 Pt alert and oriented X3. Pt short of breath with exertion. Pt lungs coarse and diminished. Pt has hypoactive bowel sounds. She is continent of bowel and bladder. Assist with turns X1. Pt has some discoloration to BLE. She needs assistance feeding. That she lacks the endurance to feed herself. Pt is total care for all adl's, Pt has no current complaints of pain 0/10, anxiety, or dyspnea at current time. 1720 pt alert and oriented X2 no complaints of pain anxiety or dyspnea  1000 s/m: pt requested anxiety medication as she feels jittery, pain 0/10  1103 reassessment: pt states she is feeling much better and was wondering when lunch was coming. Pt denies pain and sob at this time. pain 0/10  1259 pt no complaints.  pain 0/10  1500 pt with no complaints, no s/s ,no pain  1713 po medications given, pain 0/10 no s/m at this time  1840 report given to shirin gracia rn

## 2019-07-09 NOTE — PROGRESS NOTES
Problem: Falls - Risk of  Goal: *Absence of Falls  Description  Pt will remain free from falls while at [address] aeb no injuries. Outcome: Progressing Towards Goal     Problem: Pressure Injury - Risk of  Goal: *Prevention of pressure injury  Description  Document Juan Scale and appropriate interventions in the flowsheet.   Outcome: Progressing Towards Goal     Problem: Dyspnea Due to End of Life  Goal: patient will ask for medication or other necessary interventions when feeling SOB; pt will verbalize relief   Outcome: Progressing Towards Goal

## 2019-07-10 NOTE — PROGRESS NOTES
Problem: Falls - Risk of  Goal: *Absence of Falls  Description  Pt will remain free from falls while at [address] aeb no injuries. Outcome: Progressing Towards Goal     Problem: Pressure Injury - Risk of  Goal: *Prevention of pressure injury  Description  Document Juan Scale and appropriate interventions in the flowsheet. Outcome: Progressing Towards Goal     Problem: Dyspnea Due to End of Life  Goal: Demonstrate understanding of and ability to manage respiratory symptoms at end of life. Patient will tell staff when she is feeling SOB  Outcome: Progressing Towards Goal     Problem: Anxiety/Agitation  Goal: Verbalize and demonstrate ability to manage anxiety  Description    Patient will show signs of relaxation after klonopin given.  Patient will practice breathing methods shown by RN to help relax herself  Outcome: Progressing Towards Goal

## 2019-07-10 NOTE — PROGRESS NOTES
Progress Note    Patient: Mandie Finney MRN: [id number]  SSN: [id number]    YOB: 1934  Age: 80 y.o. Sex: female      Admit Date: 7/8/2019    LOS: 2 days     Subjective:     Alert and oriented. Eating small amounts. Denies pain or nausea. C/o dyspnea. Review of Systems:  Pertinent items are noted in the History of Present Illness. Objective:     Vitals:    07/08/19 1315 07/09/19 0413 07/09/19 1626 07/10/19 0419   BP: 111/77 126/72 93/60 104/56   Pulse: 100 81 93 92   Resp: 28 20 20 20   Temp: 96.6 °F (35.9 °C) 95.4 °F (35.2 °C) 96.2 °F (35.7 °C) 96.2 °F (35.7 °C)        Intake and Output:  Current Shift: 07/10 0701 - 07/10 1900  In: 100 [P.O.:100]  Out: -   Last three shifts: No intake/output data recorded. Physical Exam:   GENERAL: alert and oriented, cooperative, moderate distress, appears stated age, pale, cachectic  LUNG: Coarse diminished breath sounds with labored respirations at rest. Worsening dyspnea with conversation. HEART: regular rate and rhythm  ABDOMEN: soft, non-tender. Bowel sounds normal. No masses,  no organomegaly  EXTREMITIES:  extremities with no cyanosis or edema. + pulses. SKIN: Pale. Warm to touch. NEUROLOGIC: AOx3. Reflexes and motor strength symmetric. Cranial nerves 2-12 and sensation grossly intact. Generalized weakness. Bedbound. PSYCHIATRIC: anxious    Lab/Data Review:  No new labs resulted in the last 24 hours.     Assessment:     Principal Problem:    Acute on chronic respiratory failure with hypoxia ([address] (7/8/2019)    Active Problems:    COPD exacerbation ([address] (8/20/2014)      Pulmonary emboli (HCC) (7/8/2019)      Overview: RLL      HCAP (healthcare-associated pneumonia) (7/8/2019)      Lactic acidosis (7/8/2019)      Acute on chronic combined systolic and diastolic congestive heart failure ([address] (6/7/2019)      Graves' disease (7/8/2019)        Plan:     Current Facility-Administered Medications   Medication Dose Route Frequency    fluticasone propionate (FLONASE) 50 mcg/actuation nasal spray 2 Spray  (Patient Supplied)  2 [place] Both Nostrils DAILY    acetaminophen (TYLENOL) tablet 650 mg  650 mg Oral Q4H PRN    haloperidol lactate (HALDOL) injection 2 mg  2 mg SubCUTAneous Q1H PRN    acetaminophen (TYLENOL) suppository 650 mg  650 mg Rectal Q3H PRN    bisacodyl (DULCOLAX) suppository 10 mg  10 mg Rectal PRN    haloperidol lactate (HALDOL) injection 2 mg  2 mg SubCUTAneous Q1H PRN    morphine injection 2 mg  2 mg SubCUTAneous Q20MIN PRN    albuterol-ipratropium (DUO-NEB) 2.5 MG-0.5 MG/3 ML  3 mL Nebulization Q4H PRN    predniSONE (DELTASONE) tablet 20 mg  20 mg Oral DAILY WITH BREAKFAST    predniSONE (DELTASONE) tablet 10 mg  10 mg Oral DAILY WITH DINNER    pantoprazole (PROTONIX) tablet 40 mg  40 mg Oral ACB    morphine (ROXANOL) concentrated oral syringe 5 mg  5 mg Oral Q30MIN PRN    Or    morphine (ROXANOL) concentrated oral syringe 5 mg  5 mg SubLINGual Q30MIN PRN    haloperidol (HALDOL) 2 mg/mL oral solution 2 mg  2 mg SubLINGual Q1H PRN    glycopyrrolate (ROBINUL) injection 0.2 mg  0.2 mg SubCUTAneous Q4H PRN    loratadine (CLARITIN) tablet 10 mg  10 mg Oral DAILY PRN    clonazePAM (KlonoPIN) tablet 0.5 mg  0.5 mg Oral TID PRN    methIMAzole (TAPAZOLE) tablet 5 mg  (Patient Supplied)  5 mg Oral DAILY    ondansetron (ZOFRAN ODT) tablet 4 mg  4 mg Oral Q6H PRN       7/8: ([facility]) Admitted GIP with acute on chronic hypoxic respiratory failure for management of pain, dyspnea and anxiety/agitation.     1. Pain: Morphine 5mg po Q30 minutes prn or 2mg IV/SQ Q20 minutes as needed. Prednisone bid.      2. Dyspnea: Morphine 5mg po Q30 minutes prn or  2mg IV/SQ Q20 minutes as needed. Duonebs q4 prn. Glycopyrrolate 0.2mg q4 prn secretions. Oxygen at  6L/min prn. Prednisone BID. Symbicort inhaler BID.      3. AnxietyAgitation: Clonazepam 0.5mg Q12 and Q4 hours as needed. Haloperidol 2mg PO/IV/SQ Q1 hour prn.     4. Family/Pt Support: Family at bedside during exam. Medications and plan of care discussed with nursing staff and family. Will continue to monitor for symptoms and adjust medications as needed to maintain patient comfort. PPS 30%. Case discussed with Dr. Melissa Moody. Restart Symbicort inhaler from home. Add clonazepam q12 for anxiety. Encouraged to take po morphine to manage dyspnea and chest pain caused by untreated PE.          Signed By: Jet Davila NP     July 10, 2019

## 2019-07-10 NOTE — PROGRESS NOTES
BEATRICESW spoke to the son of the patient Ravinder Mccullough,  in the hallway. He was asking for a washcloth. LMSW was able to [rovide the son with the wash cloth. We talked about the possibly of his mom rebounding and needing a discharge plan. He is currently taking care of his elderly father at his home. 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 BEATRICESW told him that we would be monitoring her ability to  Remain GIP level of care.

## 2019-07-10 NOTE — HOSPICE
0700: Report received from [place], [facility]. Pt visualized and currently resting well. Sleeping. FLACC 0. Son at bedside. 6387: medicated with klonopin 0.5 mg PO for anxiety per Pt request. Pt ate 100% breakfast.     0900: Scheduled PO meds given per MAR. Pt swallows pills whole without difficulty. Son provided several medications and inhalers for Pt to use. Meds reviewed with Yosvany Flowers NP and placed in locked cabinet at nursing station. 1418: Medicated with roxanol 5 mg PO for dyspnea. Numeric pain scale 0/10. Some anxiety but wants to wait to take klonopin at this time. Pt requests peanut butter and bananas for snack. Kitchen unable to provide the bananas at this time but will send PB. Volunteer, Jovana Vizcaino called and will work on this. 1500: Bananas have arrived courtesy of [facility] volunteer. Placed in Pt room. Pt states she feels better after the roxanol admin.     1603: Several family members in the room. One person brought milkshake much to the Pt's delight.    1700: Pt unsure whether brief was wet and/or soiled. RN changed wet brief. 1805: PO scheduled given. Pt swallowed without difficulty.      Report given to Julia Naranjo

## 2019-07-10 NOTE — PROGRESS NOTES
1900- Report received from [facility]. Patient awake with family around. Appears SOB but denies dyspnea. Wanting to wait on her son to arrive. Bed is low and locked, tab alert is in place, call light within reach. 1919-  Prn albuterol breathing tmt given as well as prn clonopin for anxiety. Patient family member explained that what makes patient anxious is wearing the nebulizer mask, so it would be helpful if clonopin was given ahead of time to reduce anxiety. Will pass along to day shift. 1957- prn roxanol given for knee pain. 2057- pt denies any pain at this time. Prn effective.

## 2019-07-11 NOTE — PROGRESS NOTES
Progress Note    Patient: Patrica Dinh MRN: [id number]  SSN: [id number]    YOB: 1934  Age: 80 y.o. Sex: female      Admit Date: 7/8/2019    LOS: 3 days     Clinical Summary:     Mauri Ramirez is awake and alert without specific complaints. She's not had a bowel movement over days. She ate breakfast fairly well and seems to be breathing comfortably. Breath sounds are somewhat distant and coarse with some scattered rhonchi but no wheezes or respiratory distress. She is quite weak but nothing focal or lateralizing. Vitals:    07/09/19 1626 07/10/19 0419 07/10/19 1712 07/11/19 0445   BP: 93/60 104/56 127/66 91/63   Pulse: 93 92 (!) 104 97   Resp: 20 20 18 20   Temp: 96.2 °F (35.7 °C) 96.2 °F (35.7 °C) 98.2 °F (36.8 °C) 95.6 °F (35.3 °C)            Clinical Assessment:     Principal Problem:    Acute on chronic respiratory failure with hypoxia (HCC) (7/8/2019)    Active Problems:    COPD exacerbation ([address] (8/20/2014)      Pulmonary emboli (HCC) (7/8/2019)      Overview: RLL      HCAP (healthcare-associated pneumonia) (7/8/2019)      Lactic acidosis (7/8/2019)      Acute on chronic combined systolic and diastolic congestive heart failure ([address] (6/7/2019)      Overview: Last Assessment & Plan:       I provided her with Lasix 20 mg/day to take for the next 3 days while she       gets in with her primary care physician, Dr. Bud Hwang, for further       evaluation/treatment. Graves' disease (7/8/2019)        Treatment Plan:      I have reviewed the patient's Plan of Care with the nursing staff. Her trajectory seems to be that of slow improvement. We will continue to observe her over the weekend and if she continues to show signs of transition we will address possible change of locus of care. We discussed the risks and benefits of trying to get her out of bed.           Current Facility-Administered Medications   Medication Dose Route Frequency    clonazePAM (KlonoPIN) tablet 0.5 mg  0.5 mg Oral Q12H    budesonide-formoterol (SYMBICORT) 160-4.5 mcg/actuation HFA inhaler 2 Puff (Patient Supplied)  2 Puff Inhalation BID    cetirizine (ZYRTEC) tablet 10 mg (Patient Supplied)  10 mg Oral DAILY PRN    fluticasone propionate (FLONASE) 50 mcg/actuation nasal spray 2 Spray  (Patient Supplied)  2 [place] Both Nostrils DAILY    acetaminophen (TYLENOL) tablet 650 mg  650 mg Oral Q4H PRN    haloperidol lactate (HALDOL) injection 2 mg  2 mg SubCUTAneous Q1H PRN    acetaminophen (TYLENOL) suppository 650 mg  650 mg Rectal Q3H PRN    bisacodyl (DULCOLAX) suppository 10 mg  10 mg Rectal PRN    haloperidol lactate (HALDOL) injection 2 mg  2 mg SubCUTAneous Q1H PRN    morphine injection 2 mg  2 mg SubCUTAneous Q20MIN PRN    albuterol-ipratropium (DUO-NEB) 2.5 MG-0.5 MG/3 ML  3 mL Nebulization Q4H PRN    predniSONE (DELTASONE) tablet 20 mg  20 mg Oral DAILY WITH BREAKFAST    predniSONE (DELTASONE) tablet 10 mg  10 mg Oral DAILY WITH DINNER    pantoprazole (PROTONIX) tablet 40 mg  40 mg Oral ACB    morphine (ROXANOL) concentrated oral syringe 5 mg  5 mg Oral Q30MIN PRN    Or    morphine (ROXANOL) concentrated oral syringe 5 mg  5 mg SubLINGual Q30MIN PRN    haloperidol (HALDOL) 2 mg/mL oral solution 2 mg  2 mg SubLINGual Q1H PRN    glycopyrrolate (ROBINUL) injection 0.2 mg  0.2 mg SubCUTAneous Q4H PRN    clonazePAM (KlonoPIN) tablet 0.5 mg  0.5 mg Oral TID PRN    methIMAzole (TAPAZOLE) tablet 5 mg  (Patient Supplied)  5 mg Oral DAILY    ondansetron (ZOFRAN ODT) tablet 4 mg  4 mg Oral Q6H PRN           Signed By: Marilu Maharaj MD     July 11, 2019

## 2019-07-11 NOTE — PROGRESS NOTES
Pre-Bereavement Visit:    Bereavement Coordinator/ Vinny Estrada made visit in response to referral from [address] [place] [address]. Per Marino Mares, patient's spouse has health issues, and is unable to spend much time with patient at [facility]. Situation:  Patient sitting up in bed. Patient's sister, Zachary Clay, present and active throughout encounter. Background:  Patient is 80year old MWF admitted on 7/9/19 with dx of ACUTE ON CHRONIC RESPIRATORY FAILURE WITH HYPOXIA. Assess/Response:   provided support for patient through ministry of presence and encouragement. Primary bereavement issue/concern is patient's concern for the health of patient's spouse. BC/CH asked if it would be OK with patient for BC/CH to give patient's spouse a call at home, and patient seemed happy that BC/CH offered to call. Patient's sister stated that someone would actually be bringing patient's spouse to [facility] to visit later in the day. NOTE:  After visit with patient, BC/CH attempted to contact patient's spouse via phone using first Home number and then Mobile number. Patient's spouse answered Mobile number, but is very [facility] and so gave phone to someone else. Other person confirmed that patient's spouse had already arrived at [facility], and was in fact in patient's room at time of call. Plans made for BC/CH to return right away to patient's room to meet patient's spouse in person. During return visit, BC/CH introduced self to patient's spouse and provided support to pt's spouse, patient, and others in the room. Patient's spouse stated patient and spouse have been  79 years. Primary goal of making visit with patient's spouse was to establish relationship, in preparation for providing support after patient passes away. Plan of care:  Bereavement support to be provided as needed, requested, or referred.

## 2019-07-11 NOTE — PROGRESS NOTES
The patient shift change report and walking rounds completed with the off going RN at 5. The patient was identified by name and . She shows no signs of distress at this time. No signs of pain, SOB, nausea or vomit. The bed is low and locked. 5870- The AM scheduled medications were administered whole with water with no problems. The patient is alert to person, place and time. She states that she is in no pain, has no nausea or anxiety. She also states that she is not SOB at this time. 1135- The patient was assisted to the prasanna chair at the bedside. She was total lift by this RN and a CNA. She was positioned in the chair for comfort. She states, \" I am very glad to be out of the bed. \" She tolerated the transfer well. 1445- The patient was assisted back to the bed. She was cleaned of incontinent bowel movement. She is slightly SOB but is using pursed lip breathing and does not want medications at this time. 1510- The patient is now resting in the bed with no SOB. 1708- Scheduled Prednisone administered as ordered. The patient took whole. She denies any distress at this time. Family remains at the bedside with the patient. Reported off to Zandra Monroe RN and completed walking rounds.

## 2019-07-11 NOTE — PROGRESS NOTES
Support provided with a caring presence and a ministry leaflet with the following scripture. Psas 100:5  For the [address] is good and his love endures forever;his faithfulness continues through all generations.  offered words of hope and encouragement and assurance of prayer.

## 2019-07-11 NOTE — PROGRESS NOTES
1942- prn klonopin given po for aniety prior to breathing tmt. 2015- breathing tmt given for SOB    2040- patient had wet brief and BM. Brief changed. Blanche area very excoriated. Cream applied for comfort and repositioned to left side. 2321- brief changed from urine and BM. Repositioned. Patient very drowsy. Appears comfortable. Son is at bedside for the night.    0225- brief change - urine and BM    0335- brief change with just BM    0630- patient had an additional small bm. Bottom starting to bleed from excoriation. Continuing to lather cream.patient denies pain to that area. 0335- brief change after BM.

## 2019-07-11 NOTE — PROGRESS NOTES
Problem: Falls - Risk of  Goal: *Absence of Falls  Description  Pt will remain free from falls while at [address] aeb no injuries.    Outcome: Progressing Towards Goal     Problem: Pressure Injury - Risk of  Goal: *Prevention of pressure injury  Description  Pt will not develop any pressure injuries and will be receptive to methods to prevent them during this shift   Outcome: Progressing Towards Goal

## 2019-07-11 NOTE — PROGRESS NOTES
Problem: Falls - Risk of  Goal: *Absence of Falls  Description  Pt will remain free from falls while at [address] aeb no injuries.    Outcome: Progressing Towards Goal     Problem: Pressure Injury - Risk of  Goal: *Prevention of pressure injury  Description  Pt will not develop any pressure injuries and will be receptive to methods to prevent them during this shift   Outcome: Progressing Towards Goal     Problem: Dyspnea Due to End of Life  Goal: Demonstrate understanding of and ability to manage respiratory symptoms at end of life  Description  Pt will request breathing treatments as needed to manage S&S of dyspnea   Outcome: Progressing Towards Goal

## 2019-07-11 NOTE — PROGRESS NOTES
1900 - Bedside report received from [facility]. Pt identified. Pt alert, in bed, talking with friends and family. Pt requesting breathing treatment which Marian RN provided. No c/o or S&S of pain, agitation, nausea, or vomiting. FLACC 0. FBed locked and low; side rails up X2; tabs/bed alarm in place for pt safety. Call light within reach and door opened for continued monitoring. Family remains at bedside. Question/concerns addressed. No other needs at this time. Discussed plan of care with CNA and CNA operating under RN supervision. 2056 - Pt requested morphine with her scheduled meds. Pt states she takes morphine to help her sleep. No report of pain at this time. 2213 - Pt resting quietly--eyes closed. No S&S of distress at this time. Son sleeping at bedside. 0054 - Pt resting quietly--eyes closed, resp even and unlabored. No S&S of distress at this time. Son sleeping at bedside. 3591 - Pt had a restful night--sleeping throughout.  Report given to [facility]

## 2019-07-11 NOTE — PROGRESS NOTES
Problem: Falls - Risk of  Goal: *Absence of Falls  Description  Pt will remain free from falls while at [address] aeb no injuries. Outcome: Progressing Towards Goal  Note:   Fall Risk Interventions: Pt will be free of falls this shift.              Medication Interventions: Bed/chair exit alarm    Elimination Interventions: Bed/chair exit alarm, Call light in reach, Toileting schedule/hourly rounds

## 2019-07-11 NOTE — HOSPICE
Patient sitting up in bed visiting with family. Short of breath with any exertion. Scheduled medications administered along with Morphine 5mg PO to decrease shortness of breath. Son states he will stay the night with patient.

## 2019-07-12 NOTE — PROGRESS NOTES
Report received from Robbie Sneed RN. Pt I'd by name and . Pt calm. No distress. Denies pain at this time. Resp non labored on 5L n/c. Lungs diminished. BS Hyper. Abd soft. No edema noted at this time. Landaverde cath draining joseph urine. SR up x2. Bed low/locked. Call light with in reach. Family at the bedside. 2040  Pt awake. Calm. Denies pain at this time. Flacc =0-1. Resp non labored on 5L n/c. Scheduled medications given po. Pt tolerated well. SR up x2. Bed low/locked. Call light with in reach. Family at the bedside.   Pt requesting Neb tx. Neb tx given. Pt calm. No distress. Flacc =0-1. Resp non labored on 5L n/c. SR up x2. Bed low/locked. Call light with in reach. Family att he bedside. 2325  Pt arouses easily. Calm. No distress. Denies pain at this time. Flacc =0-1. Resp non labored on 5L n/c. SR up x2. Bed low/locked. Call light with in reach. Family at the bedside. 0116  Pt lying in bed. Eyes closed. No distress. No facial grimace noted. Flacc =0-1. Resp non labored on 5L n/c. SR up x2. Bed low/locked. Call light with in reach. Family at the bedside. 0342  Pt with no distress. No facial grimace. Flacc =0-1. Resp non labored on 5L n/c. Pt repositioned. Medium BM noted. Incontinent care completed. SR up x2. Bed low/locked. Call light with in reach. Family at the bedside. 8686  Pt resting comfortably. No distress. No facial grimace. Flacc =0-1. Resp non labored on 5L n/c. Pt repositioned. Medium BM noted. Incontinent care completed. SR up x2. Bed low/locked. Call light with in reach. Family at the bedside.      Report given to Janet Schirmer, RN

## 2019-07-12 NOTE — PROGRESS NOTES
BEATRICESW spoke to the son of the patient about her discharge plans. BEATRICESW proided community resources to assist the family financially with applying for Aid and Attendance for the pt and her spouse's care. BEATRICESW called [facility] and ordered the equipment needed for the in home transition. Bed, bed side table, O2with humidity, Nebulizer with 2 kits, Back up tank. All equipment will be delivered Monday am between 9-10am.      [facility] was called to arrange transport.  time is 4pm.  Family will need a tuck in when she gets home. BEARTICE has sent an e-mail to the  in home supervisors.

## 2019-07-12 NOTE — PROGRESS NOTES
Progress Note    Patient: Jose Adams MRN: [id number]  SSN: [id number]    YOB: 1934  Age: 80 y.o. Sex: female      Admit Date: 7/8/2019    LOS: 4 days     Subjective:     Alert and oriented. Eating small amounts. Denies pain or nausea. Dyspnea at rest. Taking all medications orally. Review of Systems:  Pertinent items are noted in the History of Present Illness. Objective:     Vitals:    07/10/19 0419 07/10/19 1712 07/11/19 0445 07/11/19 1359   BP: 104/56 127/66 91/63 106/66   Pulse: 92 (!) 104 97 (!) 108   Resp: 20 18 20 24   Temp: 96.2 °F (35.7 °C) 98.2 °F (36.8 °C) 95.6 °F (35.3 °C) 95.6 °F (35.3 °C)        Intake and Output:  Current Shift: 07/12 0701 - 07/12 1900  In: 120 [P.O.:120]  Out: -   Last three shifts: 07/10 1901 - 07/12 0700  In: 280 [P.O.:280]  Out: -     Physical Exam:   GENERAL: alert and oriented, cooperative, mild distress, appears stated age, pale, cachectic  LUNG: Coarse diminished breath sounds with labored respirations at rest. Worsening dyspnea with conversation. HEART: regular rate and rhythm  ABDOMEN: soft, non-tender. Bowel sounds normal. No masses,  no organomegaly  EXTREMITIES:  extremities with no cyanosis or edema. + pulses. SKIN: Pale. Warm to touch. NEUROLOGIC: AOx3. Reflexes and motor strength symmetric. Cranial nerves 2-12 and sensation grossly intact. Generalized weakness. Bedbound. PSYCHIATRIC: anxious    Lab/Data Review:  No new labs resulted in the last 24 hours.     Assessment:     Principal Problem:    Acute on chronic respiratory failure with hypoxia ([address] (7/8/2019)    Active Problems:    COPD exacerbation ([address] (8/20/2014)      Pulmonary emboli (HCC) (7/8/2019)      Overview: RLL      HCAP (healthcare-associated pneumonia) (7/8/2019)      Lactic acidosis (7/8/2019)      Acute on chronic combined systolic and diastolic congestive heart failure ([address] (6/7/2019)      Graves' disease (7/8/2019)        Plan:     Current Facility-Administered Medications   Medication Dose Route Frequency    fluconazole (DIFLUCAN) tablet 200 mg  200 mg Oral Q72H    polyvinyl alcohol (LIQUIFILM TEARS) 1.4 % ophthalmic solution 1 Drop  1 Drop Both Eyes PRN    clonazePAM (KlonoPIN) tablet 0.5 mg  0.5 mg Oral Q12H    budesonide-formoterol (SYMBICORT) 160-4.5 mcg/actuation HFA inhaler 2 Puff (Patient Supplied)  2 Puff Inhalation BID    cetirizine (ZYRTEC) tablet 10 mg (Patient Supplied)  10 mg Oral DAILY PRN    fluticasone propionate (FLONASE) 50 mcg/actuation nasal spray 2 Spray  (Patient Supplied)  2 [place] Both Nostrils DAILY    acetaminophen (TYLENOL) tablet 650 mg  650 mg Oral Q4H PRN    haloperidol lactate (HALDOL) injection 2 mg  2 mg SubCUTAneous Q1H PRN    acetaminophen (TYLENOL) suppository 650 mg  650 mg Rectal Q3H PRN    bisacodyl (DULCOLAX) suppository 10 mg  10 mg Rectal PRN    haloperidol lactate (HALDOL) injection 2 mg  2 mg SubCUTAneous Q1H PRN    morphine injection 2 mg  2 mg SubCUTAneous Q20MIN PRN    albuterol-ipratropium (DUO-NEB) 2.5 MG-0.5 MG/3 ML  3 mL Nebulization Q4H PRN    predniSONE (DELTASONE) tablet 20 mg  20 mg Oral DAILY WITH BREAKFAST    predniSONE (DELTASONE) tablet 10 mg  10 mg Oral DAILY WITH DINNER    pantoprazole (PROTONIX) tablet 40 mg  40 mg Oral ACB    morphine (ROXANOL) concentrated oral syringe 5 mg  5 mg Oral Q30MIN PRN    Or    morphine (ROXANOL) concentrated oral syringe 5 mg  5 mg SubLINGual Q30MIN PRN    haloperidol (HALDOL) 2 mg/mL oral solution 2 mg  2 mg SubLINGual Q1H PRN    glycopyrrolate (ROBINUL) injection 0.2 mg  0.2 mg SubCUTAneous Q4H PRN    clonazePAM (KlonoPIN) tablet 0.5 mg  0.5 mg Oral TID PRN    methIMAzole (TAPAZOLE) tablet 5 mg  (Patient Supplied)  5 mg Oral DAILY    ondansetron (ZOFRAN ODT) tablet 4 mg  4 mg Oral Q6H PRN       7/8: ([facility]) Admitted GIP with acute on chronic hypoxic respiratory failure for management of pain, dyspnea and anxiety/agitation.     1. Pain: Morphine 5mg po Q4 prn or 2mg IV/SQ Q20 minutes as needed. Prednisone bid.      2. Dyspnea: Morphine 5mg po Q4 hours prn or  2mg IV/SQ Q20 minutes as needed. Duonebs q4 prn. Oxygen at 5L/min prn. Prednisone BID. Symbicort inhaler BID.      3. AnxietyAgitation: Clonazepam 0.5mg Q12 and Q4 hours as needed. Haloperidol 2mg PO Q4 hour prn.     4. Family/Pt Support: Family at bedside during exam. Medications and plan of care discussed with nursing staff and family. Will continue to monitor for symptoms and adjust medications as needed to maintain patient comfort. PPS 30%. Case discussed with Dr. Hermes Pepe and [facility]. Son and daughter in law present for IDG today. Change to routine level of care. Landaverde catheter placed this morning. Add senna bid. Add wound care for perineal/buttocks excoriation. Reduce oxygen to 5L/min and add humidification. Add Diflucan q72 hours x 3 doses for yeast infection. Add natural tears for eye discomfort. Add warfarin 2mg daily to treat pulmonary emboli with PT/INR check on 7/15. Will plan to reduce prednisone to 10mg bid on 7/15. Discontinue Haldol, glycopyrrolate SQ. Will leave Morphine SQ in case of respiratory distress. Will have social work assist family with home equipment needs and help with veterans benefits ( was in the [employer]).      Signed By: Enmanuel Bonner NP     July 12, 2019

## 2019-07-12 NOTE — [facility] IDG CHAPLAIN NOTES
Patient: Álvaro Starks    Date: 07/12/19  Time: 11:20 AM    [facility]  Notes  Intervention: Ministry of music, family support, scripture, prayer. Assessed grief response of patient and family. Outcome: Patient appears to be weary. The son says mom is tired.  noted some difference in her expressions. She expressed to her son that 's visit was meaningful. Progressing toward the goal    Note: Patient's son attended IDG. Patient's treatment discussed. Transfer to the In home program discussed.          Signed by: Dina Siemens

## 2019-07-12 NOTE — [facility] IDG SOCIAL WORKER NOTES
Patient: Jose Adams    Date: 07/12/19  Time: 9:35 AM    [facility]  Notes  LMSW will continue to provide emotional support. Pt's son and daughter in law came to the [facility] meeting. Pt has been changed to routine level of care today. Level of care discussion was had and routine room and board fees $.  Pt has rebounded and her family will be taking her home with them. LMSW will arrange for delivery of medical equipment. Plan for DC on Monday.           Signed by: Brandon Beal

## 2019-07-12 NOTE — PROGRESS NOTES
The patient report and walking rounds completed with Javed Moses, off going RN. The patient was identified by name and . Patient is resting with eyes closed in the bed. She  Shows no signs of distress at this time. Pain is 0 on nonverbal scale. No signs of anxiety, SOB or vomit present. The bed is low and locked and the tab alert is in place. The patient's son is sleeping on the cot at the bedside. 0815-AM medications administered whole with water with no problems. The patient is alert and oriented times 4. The patient's son remains at the bedside. Summary  The patient has remained alert to person, place and time. She has only taken scheduled medications and one Neb tx. She has been up in the prasanna chair for 2 hours. Landaverde catheter placed with no problems. Drained 950 ml of clear yellow urine. She has eaten all of her meals feeding self. Reported off to the on coming nurse, Luz Brower RN.

## 2019-07-12 NOTE — [facility] IDG NURSE NOTES
Patient: Timo Edge    Date: 07/12/19  Time: 2:55 PM    [facility] Nurse Notes  1st IDG: Pt is a 80year old female with respiratory failure with management of pain/dyspnea/agitation. Has had several BMs since yesterday. Landaverde placed today with return of 950 cc. Excoriation in ambrocio area. Clonazepam  twice a day for anxiety and has had 1 PRN dose in 24 hours. Soft mechanical diet. Family present in [address] [place] [address]. Plan: Start warfarin 2 mg PO daily with PT and INR checks on 7/15. Senna BID, start wound care for ambrocio area with Diflucan q 72 hours x 3 doses for yeast infection. Add natural tears for dry eyes. Decrease prednisone to 10 mg bid on 7/15. Decreased oxygen to 5 l/min with humidification. Change SQ meds to oral. Keep SQ morphine in case of need. Add Lasix 20 mg PO daily. Change to routine level of care today and initiate billing on 7/13/19. Comprehensive plan of care reviewed. IDG and pt./family in agreement with plan of care. The IDG identifies through on-going assessment when a change is needed to the POC; the pt/family will receive care and services necessitated by changes in POC. Medications reviewed by the pharmacist and Medical Director.         Signed by: Cait Miller RN

## 2019-07-12 NOTE — [facility] IDG MASTER NOTE
[facility]  Date: 07/12/19  Time: 2:57 PM    ___________________    Patient: Gali Fernández  Coverage Information:     Payor: [place] MEDICARE     Plan: [facility] MEDICARE     Subscriber ID: [id number]     Phone Number:   MRN: [id number]  Current Benefit Period: Benefit Period 1  Start Date: 7/8/2019  End Date: 10/5/2019        Hospice Attending Provider: No Hospice attending provider. 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     Current Medications:    Current Facility-Administered Medications:     fluconazole (DIFLUCAN) tablet 200 mg, 200 mg, Oral, Q72H, Remy Heller NP, 200 mg at 07/12/19 1025    polyvinyl alcohol (LIQUIFILM TEARS) 1.4 % ophthalmic solution 1 Drop, 1 Drop, Both Eyes, PRN, Kalen Zuñiga MD    warfarin (COUMADIN) tablet 2 mg (Patient Supplied), 2 mg, Oral, DAILY WITH DINNER, Remy Heller NP    senna (SENOKOT) tablet 8.6 mg, 1 Tab, Oral, BID, Remy Heller NP, 8.6 mg at 07/12/19 1350    hyoscyamine SL (LEVSIN/SL) tablet 0.125 mg, 0.125 mg, Oral, Q4H PRN, Remy Heller NP    haloperidol (HALDOL) 2 mg/mL oral solution 2 mg, 2 mg, Oral, Q4H PRN, Remy Heller NP  [facility]  [START ON 7/13/2019] furosemide (LASIX) tablet 20 mg, 20 mg, Oral, DAILY, Sandhya Faith NP    LORazepam (ATIVAN) tablet 1 mg, 1 mg, Oral, Q4H PRN, Remy Heller NP    LORazepam (ATIVAN) tablet 1 mg, 1 mg, Oral, Q12H, Remy Heller NP, 1 mg at 07/12/19 1456    budesonide-formoterol (SYMBICORT) 160-4.5 mcg/actuation HFA inhaler 2 Puff (Patient Supplied), 2 Puff, Inhalation, BID, Remy Heller NP, 2 Puff at 07/12/19 0900    cetirizine (ZYRTEC) tablet 10 mg (Patient Supplied), 10 mg, Oral, DAILY PRN, Dufm Sidle, NP    fluticasone propionate (FLONASE) 50 mcg/actuation nasal spray 2 Spray  (Patient Supplied), 2 Spray, Both Nostrils, DAILY, Dufm Sidle, NP, [address] [place] at 07/12/19 0900    acetaminophen (TYLENOL) tablet 650 mg, 650 mg, Oral, Q4H PRN, Dufm Sidle, NP    acetaminophen (TYLENOL) suppository 650 mg, 650 mg, Rectal, Q3H PRN, Dufm Sidle, NP    bisacodyl (DULCOLAX) suppository 10 mg, 10 mg, Rectal, PRN, Dufm Sidle, NP, 10 mg at 07/11/19 0903    morphine injection 2 mg, 2 mg, SubCUTAneous, Q20MIN PRN **OR** [DISCONTINUED] morphine injection 2 mg, 2 mg, IntraVENous, Q20MIN PRN, Dufm Sidle, NP    albuterol-ipratropium (DUO-NEB) 2.5 MG-0.5 MG/3 ML, 3 mL, Nebulization, Q4H PRN, Dufm Sidle, NP, 3 mL at 07/12/19 0820    predniSONE (DELTASONE) tablet 20 mg, 20 mg, Oral, DAILY WITH BREAKFAST, Dufm Sidle, NP, 20 mg at 07/12/19 0816    predniSONE (DELTASONE) tablet 10 mg, 10 mg, Oral, DAILY WITH DINNER, Dufm Sidle, NP, 10 mg at 07/11/19 1708    pantoprazole (PROTONIX) tablet 40 mg, 40 mg, Oral, ACB, Dufm Sidle, NP, 40 mg at 07/12/19 0816    morphine (ROXANOL) concentrated oral syringe 5 mg, 5 mg, Oral, Q30MIN PRN, 5 mg at 07/12/19 0500 **OR** [DISCONTINUED] morphine (ROXANOL) concentrated oral syringe 5 mg, 5 mg, SubLINGual, Q30MIN PRN, Dufm Sidle, NP    methIMAzole (TAPAZOLE) tablet 5 mg  (Patient Supplied), 5 mg, Oral, DAILY, Dufm Sidle, NP, 5 mg at 07/12/19 0900    ondansetron (ZOFRAN ODT) tablet 4 mg, 4 mg, Oral, Q6H PRN, Dufm Sidle, NP    Orders:  Orders Placed This Encounter    IP CONSULT TO SPIRITUAL CARE Once on week one, then PRN. For [facility] patients only. For contracted patients, primary hospice will continue to manage spiritual care needs     Once on week one, then PRN. For [facility] patients only.  For contracted patients, primary hospice will continue to manage spiritual care needs     Standing Status:   Standing     Number of Occurrences:   1     Order Specific Question:   Reason for Consult: Answer:   Spiritual crisis intervention or per patient or caregiver request    DIET MECHANICAL SOFT     Grits, eggs, and black coffee for breakfast. Water (no tea) with all meals. LOVES baked sweet potatoes. Ground meats please. Standing Status:   Standing     Number of Occurrences:   1    VITAL SIGNS     Standing Status:   Standing     Number of Occurrences:   1    VITAL SIGNS     Standing Status:   Standing     Number of Occurrences:   1    NURSING-MISCELLANEOUS: comfort measures Enter comfort measures above. CONTINUOUS     Enter comfort measures above. Standing Status:   Standing     Number of Occurrences:   1     Order Specific Question:   Description of Order:     Answer:   comfort measures    SUTTON CATHETER, CARE     1. Sutton Catheter care every shift and PRN  2. Notify Physician of Sutton Catheter leakage, occlusion, gross adherent sediment or accidental removal  3. Change Sutton 30 days after insertion. 4. May flush catheter prn leakage or gross adherent sediment or mucus. Standing Status:   Standing     Number of Occurrences:   1    BLADDER CHECKS     May scan bladder PRN for urinary retention and or patient discomfort     Standing Status:   Standing     Number of Occurrences:   1    NURSING ASSESSMENT:  SPECIFY Assess for GIP, routine, or respite level of care. Q SHIFT Routine     Standing Status:   Standing     Number of Occurrences:   1     Order Specific Question:   Please describe the test or procedure you would like to order. Answer:   Assess for GIP, routine, or respite level of care.  PAIN ASSESSMENT Pain and Symptoms: Assess ever 4 hours and PRN, for GIP level of care. PRN Routine     Standing Status:   Standing     Number of Occurrences:   1     Order Specific Question:   Please describe the test or procedure you would like to order.      Answer:   Pain and Symptoms: Assess ever 4 hours and PRN, for [facility] level of care.  NURSING-MISCELLANEOUS: admit 7/8: ([facility]) Admitted [facility] with acute on chronic hypoxic respiratory failure for management of pain, dyspnea and agitation. This is an order to admit Jerald Sanchez to inpatient hospice care at [address] 7/8: ([facility]) Admitted GIP with acute on chronic hypoxic respiratory failure for management of pain, dyspnea and agitation. This is an order to admit Jerald Sanchez to inpatient hospice care at [facility], for a first 90 day benefit interval.  She is an 59-year-old woman admitted to [facility] last week with hypoxic respiratory failure due to several active causes. The patient sustained pulmonary embolism to the right lower and right middle lobes of severity sufficient to trigger necrosis within the right middle lobe. There was concomitant community-acquired pneumonia with white blood cell count rising to 19,000 and lactic acid rising tom3.5. Concomitant acute on chronic left ventricular dysfunction associated CHF and Cor Pulmonale refracted on echocardiogram while BNP was 236. Patient has  a long history of tobacco abuse and chronic obstructive pulmonary disease. She was treated with broad-spectrum IV antibiotics and systemic anticoagulation. While her FiO2 requirement has decreased from high flow nasal cannula 40 L/m to 4 L/m via nasal cannula, the patient's functional status remains extraordinarily compromised with persistence of dyspnea at rest.  The patient is requiring frequent doses of parenteral opioid for chest pain and dyspnea. The patient's family members (her son Jason Lung as [place]) have elected with her consent to stop life extending interventions and to limit further care to comfort measures only. C. Difficile negative. 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   Her life expectancy under comfort care is less than 1 week. Graves' disease with controlled hyperthyroidism isn't unrelated diagnoses and further compromising this octogenarian woman's prognosis. Standing Status:   Standing     Number of Occurrences:   1     Order Specific Question:   Description of Order:     Answer:   admit    WOUND CARE, DRESSING CHANGE     Wound Care:  Location: Perineal, dominik cleft and buttocks  Excoriation- Cleanse wound location with wound cleanser, pat dry and apply Antifungal Cream every 12 hours and PRN if soiled. Turn every 2 hours. Assess with each nursing assessment visit. Standing Status:   Standing     Number of Occurrences:   29    NURSING-MISCELLANEOUS: Change to routine level of care effective 19 and initate billing today. CONTINUOUS     Standing Status:   Standing     Number of Occurrences:   1     Order Specific Question:   Description of Order:     Answer:   Change to routine level of care effective 19 and initate billing today.  NURSE TO COLLECT LABS     Standing Status:   Standing     Number of Occurrences:   1     Order Specific Question:   Please collect the following labs     Answer:   Please collect PT/INR on Monday 7/15/19     Order Specific Question:   Admitting Diagnosis     Answer:   Acute on chronic respiratory failure with hypoxia [facility]) [[id number]]    ACTIVITY AS TOLERATED W/ASSIST     May get out of bed to [facility] as tolerated. Standing Status:   Standing     Number of Occurrences:   1    DO NOT RESUSCITATE     Standing Status:   Standing     Number of Occurrences:   1    OXYGEN CANNULA Liters per minute: 5; Indications for O2 therapy: RESPIRATORY DISTRESS PRN Routine     Please add humidification. Standing Status:   Standing     Number of Occurrences:   1     Order Specific Question:   Liters per minute:      Answer:   5     Order Specific Question:   Indications for O2 therapy     Answer:   RESPIRATORY DISTRESS    DISCONTD: sodium chloride (NS) flush 3 mL    DISCONTD: sodium chloride (NS) flush 3 mL    DISCONTD: haloperidol lactate (HALDOL) injection 2 mg    DISCONTD: haloperidol lactate (HALDOL) injection 2 mg    acetaminophen (TYLENOL) suppository 650 mg    bisacodyl (DULCOLAX) suppository 10 mg    DISCONTD: haloperidol lactate (HALDOL) injection 2 mg    DISCONTD: haloperidol lactate (HALDOL) injection 2 mg    DISCONTD: glycopyrrolate (ROBINUL) injection 0.2 mg    morphine injection 2 mg    DISCONTD: morphine injection 2 mg    albuterol-ipratropium (DUO-NEB) 2.5 MG-0.5 MG/3 ML     Order Specific Question:   MODE OF DELIVERY     Answer:   Nebulizer    predniSONE (DELTASONE) tablet 20 mg    predniSONE (DELTASONE) tablet 10 mg    pantoprazole (PROTONIX) tablet 40 mg     Order Specific Question:   PPI INDICATION     Answer:   SUP Prophylaxis    morphine (ROXANOL) concentrated oral syringe 5 mg    DISCONTD: morphine (ROXANOL) concentrated oral syringe 5 mg    DISCONTD: haloperidol (HALDOL) 2 mg/mL oral solution 2 mg    apixaban (ELIQUIS) 5 mg tablet     Sig: Take 10 mg by mouth every twelve (12) hours.  DISCONTD: arformoterol (BROVANA) 15 mcg/2 mL nebu neb solution     Sig: 15 mcg by Nebulization route two (2) times a day.  DISCONTD: budesonide (PULMICORT) 0.5 mg/2 mL nbsp     Si mcg by Nebulization route two (2) times a day.  docusate sodium (COLACE) 100 mg capsule     Sig: Take 100 mg by mouth two (2) times a day.  fluticasone propionate (FLONASE ALLERGY RELIEF) 50 mcg/actuation nasal spray     Si Sprays by Both Nostrils route daily.  furosemide (LASIX) 20 mg tablet     Sig: Take 20 mg by mouth daily.  pantoprazole (PROTONIX) 40 mg tablet     Sig: Take 40 mg by mouth daily.  predniSONE (DELTASONE) 10 mg tablet     Sig: Take 40 mg by mouth daily (with breakfast).  clonazePAM (KLONOPIN) 0.5 mg tablet     Sig: Take 0.5 mg by mouth three (3) times daily as needed (anxiety).     ondansetron (ZOFRAN ODT) 4 mg disintegrating tablet     Sig: Take 4 mg by mouth every six (6) hours as needed for Nausea.  senna (SENNA) 8.6 mg tablet     Sig: Take 1 Tab by mouth daily as needed for Constipation.  cetirizine (ZYRTEC) 10 mg tablet     Sig: Take 10 mg by mouth daily as needed for Allergies.  DISCONTD: glycopyrrolate (ROBINUL) injection 0.2 mg    DISCONTD: loratadine (CLARITIN) tablet 10 mg    DISCONTD: clonazePAM (KlonoPIN) tablet 0.5 mg     OP SIG:Take 0.5 mg by mouth three (3) times daily as needed (anxiety).  methIMAzole (TAPAZOLE) tablet 5 mg  (Patient Supplied)     OP SIG:Take 10 mg by mouth daily.  ondansetron (ZOFRAN ODT) tablet 4 mg     OP SIG:Take 4 mg by mouth every six (6) hours as needed for Nausea.  fluticasone propionate (FLONASE) 50 mcg/actuation nasal spray 2 Spray  (Patient Supplied)     OP SI Sprays by Both Nostrils route daily.  acetaminophen (TYLENOL) tablet 650 mg    DISCONTD: clonazePAM (KlonoPIN) tablet 0.5 mg    budesonide-formoterol (SYMBICORT) 160-4.5 mcg/actuation HFAA     Sig: Take 2 Puffs by inhalation two (2) times a day.  budesonide-formoterol (SYMBICORT) 160-4.5 mcg/actuation HFA inhaler 2 Puff (Patient Supplied)     OP SIG:Take 2 Puffs by inhalation two (2) times a day.  cetirizine (ZYRTEC) 10 mg tablet     Sig: Take 10 mg by mouth daily as needed for Allergies.  cetirizine (ZYRTEC) tablet 10 mg (Patient Supplied)     OP SIG:Take 10 mg by mouth daily as needed for Allergies.  fluconazole (DIFLUCAN) tablet 200 mg     Order Specific Question:   Antibiotic Indications     Answer:   OB/Gyn Infection    LORazepam (ATIVAN) tablet 1 mg    polyvinyl alcohol (LIQUIFILM TEARS) 1.4 % ophthalmic solution 1 Drop    warfarin (COUMADIN) tablet 2 mg (Patient Supplied)     Order Specific Question:   Specific disease being treated with this drug.      Answer:   pulmonary emboli     Order Specific Question:   Is this requested medication unique in class, structure or indication     Answer:   Yes     Order Specific Question:   Reasons for patient to receive nonformulary medication     Answer:   pulmonary emboli    senna (SENOKOT) tablet 8.6 mg    hyoscyamine SL (LEVSIN/SL) tablet 0.125 mg    haloperidol (HALDOL) 2 mg/mL oral solution 2 mg    furosemide (LASIX) tablet 20 mg    LORazepam (ATIVAN) tablet 1 mg    LORazepam (ATIVAN) tablet 1 mg    INITIAL PHYSICIAN ORDER: HOSPICE Level Of Care: General Inpatient; Reason for Admission: 7/8: ([facility]) Admitted GIP with acute on chronic hypoxic respiratory failure for management of pain, dyspnea and agitation. Standing Status:   Standing     Number of Occurrences:   1     Order Specific Question:   Status     Answer:   Hospice     Order Specific Question:   Level Of Care     Answer:   General Inpatient     Order Specific Question:   Reason for Admission     Answer:   7/8: ([facility]) Admitted GIP with acute on chronic hypoxic respiratory failure for management of pain, dyspnea and agitation. Order Specific Question:   Inpatient Hospitalization Certified Necessary for the Following Reasons     Answer:   3. Patient receiving treatment that can only be provided in an inpatient setting (further clarification in H&P documentation)     Order Specific Question:   Admitting Diagnosis     Answer:   Acute on chronic respiratory failure with hypoxia [facility]) [[id number]]     Order Specific Question:   Terminal Prognosis Diagnosis(es)     Answer:   Acute on chronic respiratory failure with hypoxia [facility]) [[id number]]     Order Specific Question:   Admitting Physician     Answer:   Qamar Heller     Order Specific Question:   Attending Physician     Answer:   Qamar Heller     Order Specific Question:   Discharge Plan:     Answer:    Other (Specify)    IP CONSULT TO SOCIAL WORK     Once on week one, then PRN for Psychosocial crisis intervention or per patient or caregiver request.  For [facility] patients only. For contracted patients, primary hospice will continue to manage social work needs. Standing Status:   Standing     Number of Occurrences:   1     Order Specific Question:   Reason for Consult: Answer: Once on week one, then PRN for Psychosocial crisis intervention or per patient or caregiver request.       Allergies: Allergies   Allergen Reactions    Codeine Unknown (comments)     Not sure if intol or allergy  Other reaction(s): Nausea and/or vomiting-Intolerance    Aclidinium Bromide Unknown (comments)     Patient unable to tell me what kind of reaction she had.      Advair Diskus [Fluticasone Propion-Salmeterol] Other (comments)     Diff breathing    Alendronate Other (comments)     Not sure if intol or allergy  Other reaction(s): Rash-Allergy    Amoxicillin-Pot Clavulanate Diarrhea    Avelox [Moxifloxacin] Itching     rash    Boniva [Ibandronate] Other (comments)     Not sure if intol or allergy    Cefzil [Cefprozil] Other (comments)     Not sure if intol or allergy    Daliresp [Roflumilast] Nausea Only    Risedronate Other (comments)     Not sure if intol or allergy  Other reaction(s): Unknown-Unspecified  Patient can't remember    Salmeterol Unknown (comments)    Spiriva With Handihaler [Tiotropium Bromide] Other (comments)     Diff breathing       Care Plan:  Multidisciplinary Problems (Active)     Problem: Aide Supervisory Visit     Dates: Start: 07/08/19       Description:     Disciplines: Interdisciplinary    Goal: Supervision of Home Health Aide     Dates: Start: 07/08/19   Expected End: 07/22/19       Description:     Disciplines: Interdisciplinary    Intervention: Aide supervisory visit     Dates: Start: 07/08/19       Description:                       Problem: Anticipatory Grief     Dates: Start: 07/08/19       Description:     Disciplines: Interdisciplinary    Goal: Explore reactions to and verbalize acceptance of impending loss     Dates: Start: 07/08/19   Expected End: 07/22/19       Description: Patient/family/caregiver will explore reactions to and verbalize acceptance of impending loss. Disciplines: Interdisciplinary    Intervention: Assess grief responses     Dates: Start: 07/08/19       Description:           Intervention: Assist with grief counseling     Dates: Start: 07/08/19       Description:  to assist.          Intervention: Murali Mckeon on stages of grief     Dates: Start: 07/08/19       Description: Instruct patient/caregiver on stages of grief. Intervention: Offer grief support group     Dates: Start: 07/08/19       Description: Patient/family/caregiver will explore reactions to and verbalize acceptance of impending loss. Problem: Anticipatory Grief     Dates: Start: 07/09/19       Description:     Disciplines: Interdisciplinary    Goal: Grief heard and acknowledged, anxiety reduced, patient coping identified, patient/family expressed gratitude     Dates: Start: 07/09/19   Expected End: 07/12/19       Description: Anxiety identified around patient's  having health issues. Disciplines: Interdisciplinary    Intervention: Assess grief responses     Dates: Start: 07/09/19       Description: Assess for feelings of grief          Intervention: Support grieving process (Discontinued)     Dates: Start: 07/09/19   End: 07/12/19    Description: Address feelings of loss, anticipatory grief, expression of concern. Mrs. Mayuri Lynch says she is ready to go when God calls her. She has concerns for her  who has health issues of his own. She has been caring for him up to this point.                         Problem: Anxiety/Agitation     Dates: Start: 07/10/19       Description:     Disciplines: Interdisciplinary    Goal: Verbalize and demonstrate ability to manage anxiety     Dates: Start: 07/10/19   Expected End: 07/13/19       Description: The patient/family/caregiver will verbalize and demonstrate ability to manage the patient's anxiety throughout hospice care. Disciplines: Interdisciplinary    Intervention: Assess for anxiety/agitation     Dates: Start: 07/10/19       Description: Assess for signs and symptoms of anxiety and agitation. Intervention: Instruction strategies to reduce anxiety/agitation     Dates: Start: 07/10/19       Description: Instruct patient/caregiver on strategies to reduce anxiety/agitation. Problem: Coping and Emotional Distress     Dates: Start: 07/08/19       Description:     Disciplines: Interdisciplinary    Goal: Demonstrate acceptance of terminal illness and understanding of disease progression     Dates: Start: 07/08/19   Expected End: 07/22/19       Description: Patient/family/caregiver will demonstrate acceptance of terminal disease and understanding of disease progression while employing appropriate coping mechanisms. Disciplines: Interdisciplinary    Intervention: Assess for alteration in coping     Dates: Start: 07/08/19       Description:           Intervention: Assess for signs/symptoms of emotional distress     Dates: Start: 07/08/19       Description:           Intervention: Instruct on effective coping skills     Dates: Start: 07/08/19       Description: Instruct patient/caregiver on effective coping skills. Intervention: Instruct on strategies to reduce emotional distress     Dates: Start: 07/08/19       Description: Instruct patient/caregiver on strategies to reduce emotional distress. Problem: Dyspnea Due to End of Life     Dates: Start: 07/08/19       Description: Pt will remain free from dyspnea aeb no shortness of breath while at [address]      Disciplines: Interdisciplinary    Goal: Demonstrate understanding of and ability to manage respiratory symptoms at end of life     Dates: Start: 07/08/19   Expected End: 07/13/19       Description: Pt will request breathing treatments as needed to manage S&S of dyspnea     Disciplines: Interdisciplinary    Intervention: Assess for signs and symptoms of dyspnea     Dates: Start: 19       Description:           Intervention: Instruct on causes/symptoms of dyspnea     Dates: Start: 19       Description:           Intervention: Georgina Rivas patient/caregiver/family on strategies to effectively manage dyspnea     Dates: Start: 19       Description: Carlos arnolds  prednisone  morphine                       Problem: Falls - Risk of     Dates: Start: 19       Description:     Disciplines: Interdisciplinary    Goal: *Absence of Falls     Dates: Start: 19   Expected End: 19       Description: Pt will remain free from falls while at [address] aeb no injuries. Disciplines: Interdisciplinary                Problem: Grieving     Dates: Start: 19       Description:     Disciplines: Interdisciplinary    Goal: *Able to identify stages of grieving process     Dates: Start: 19   Expected End: 19       Priority: High    Description: Family will pick a  home with in 4 days      Disciplines: Interdisciplinary    Intervention: Assist patient and family to decide about autopsy,  plans, completing important life tasks, coping with impending death, engaging in meaningful rituals, providing care of the body after death     Dates: Start: 19       Description:                       Problem: Pressure Injury - Risk of     Dates: Start: 19       Description: Pt will remain free from pressure sores while at [address] aeb no breaks in the skin.      Disciplines: Interdisciplinary    Goal: *Prevention of pressure injury     Dates: Start: 19   Expected End: 19       Description: Pt will not develop any pressure injuries and will be receptive to methods to prevent them during this shift     Disciplines: Interdisciplinary                Problem: Spiritual Distress     Dates: Start: 19 Description:     Disciplines: Interdisciplinary    Goal: Distress heard, acknowledged, and addressed     Dates: Start: 07/08/19   Expected End: 07/22/19       Description: Patient/family/caregiver distress will be heard, acknowledged, and addressed throughout hospice care. Disciplines: Interdisciplinary    Intervention: Assess for signs/symptoms of spiritual distress     Dates: Start: 07/08/19       Description:           Intervention: Consult on spiritual care     Dates: Start: 07/08/19       Description: Consult to Spiritual Care          Intervention: Discuss strategies to reduce spiritual distress     Dates: Start: 07/08/19       Description: Discuss with patient/caregiver strategies to reduce spiritual distress. Problem: Spiritual Evaluation     Dates: Start: 07/09/19       Description:     Disciplines: Interdisciplinary    Goal: Identify beliefs/practices that support hospice experience     Dates: Start: 07/09/19   Expected End: 07/12/19       Priority: Low    Description: Patient/family identify their beliefs/practices that impair Hospice experience. Patient/family identify their beliefs/practices that support Hospice experience. Patient coping identified. Spiritual distress identified and decreased with visit. [employer] Background Identified. Good pastoral support from the [employer]. No spiritual distress identified at this point. Nothing identified at this point that will hinder the hospice experience. Disciplines: Interdisciplinary    Intervention: Assess spiritual needs     Dates: Start: 07/09/19       Description: Assist with spiritual questions          Intervention: Assist with spiritual resources     Dates: Start: 07/09/19       Description:           Intervention: Provide spiritual support (Discontinued)     Dates: Start: 07/09/19   End: 07/12/19    Description: Assist with coordination of spiritual needs.   Needs will include scripture, prayer, active and reflective listening and music as appropriate.                           Care Plan Problems/Goals      Progressing Towards Goal (10)     *Absence of Falls     Problem: Falls - Risk of    Disciplines: Interdisciplinary    Expected end:  07/22/19     Progressing Towards Goal By Lc Escudero RN on 07/11/19 1438               *Prevention of pressure injury     Problem: Pressure Injury - Risk of    Disciplines: Interdisciplinary    Expected end:  07/22/19     Progressing Towards Goal By Lc Escudero RN on 07/11/19 1438               Supervision of Home Health Aide     Problem: Aide Supervisory Visit    Disciplines: Interdisciplinary    Expected end:  07/22/19     Progressing Towards Goal By Manuela Draper RN on 07/08/19 1515               Explore reactions to and verbalize acceptance of impending loss     Problem: Anticipatory Grief    Disciplines: Interdisciplinary    Expected end:  07/22/19     Progressing Towards Goal By Manuela Draper RN on 07/08/19 1515               Demonstrate acceptance of terminal illness and understanding of disease progression     Problem: Coping and Emotional Distress    Disciplines: Interdisciplinary    Expected end:  07/22/19     Progressing Towards Goal By Manuela Draper RN on 07/08/19 0625               Distress heard, acknowledged, and addressed     Problem: Spiritual Distress    Disciplines: Interdisciplinary    Expected end:  07/22/19     Progressing Towards Goal By Manuela Draper RN on 07/08/19 1515               Demonstrate understanding of and ability to manage respiratory symptoms at end of life     Problem: Dyspnea Due to End of Life    Disciplines: Interdisciplinary    Expected end:  07/13/19     Progressing Towards Goal By Alfredo Glass RN on 07/11/19 3012               Grief heard and acknowledged, anxiety reduced, patient coping identified, patient/family expressed gratitude     Problem: Anticipatory Grief    Disciplines: Interdisciplinary Expected end:  07/12/19     Progressing Towards Goal By Ishmael Sheets on 07/09/19 1115               Identify beliefs/practices that support hospice experience     Problem: Spiritual Evaluation    Disciplines: Interdisciplinary    Expected end:  07/12/19     Progressing Towards Goal By Ishmael Sheets on 07/09/19 1115               Verbalize and demonstrate ability to manage anxiety     Problem: Anxiety/Agitation    Disciplines: Interdisciplinary    Expected end:  07/13/19     Progressing Towards Goal By Jt Valerio RN on 07/10/19 2014                      No Outcome (1)     *Able to identify stages of grieving process     Problem: Grieving    Disciplines:     Expected end:  07/12/19                             ___________________    Care Team Notes          POC/IDG Notes      [facility] IDG Nurse Notes by Heri Campa RN at 07/12/19 1455  Version 1 of 1    Author:  Heri Campa RN Service:  Yas Obando Author Type:  Registered Nurse    Filed:  07/12/19 1456 Date of Service:  07/12/19 1455 Status:  Signed    :  Heri Campa RN (Registered Nurse)       Patient: Isamar Avery    Date: 07/12/19  Time: 2:55 PM    [facility] Nurse Notes  1st IDG: Pt is a 80year old female with respiratory failure with management of pain/dyspnea/agitation. Has had several BMs since yesterday. Landaverde placed today with return of 950 cc. Excoriation in ambrocio area. Clonazepam  twice a day for anxiety and has had 1 PRN dose in 24 hours. Soft mechanical diet. Family present in [address] [place] [address]. Plan: Start warfarin 2 mg PO daily with PT and INR checks on 7/15. Senna BID, start wound care for ambrocio area with Diflucan q 72 hours x 3 doses for yeast infection. Add natural tears for dry eyes. Decrease prednisone to 10 mg bid on 7/15. Decreased oxygen to 5 l/min with humidification. Change SQ meds to oral. Keep SQ morphine in case of need. Add Lasix 20 mg PO daily. Change to routine level of care today and initiate billing on 7/13/19.  Comprehensive plan of care reviewed. IDG and pt./family in agreement with plan of care. The IDG identifies through on-going assessment when a change is needed to the POC; the pt/family will receive care and services necessitated by changes in POC. Medications reviewed by the pharmacist and Medical Director. Signed by: Joslyn Robles RN       [facility] ID[facility]  Notes by Carlee Ramachandran at 07/12/19 0935  Version 1 of 1    Author:  Carlee Ramachandran Service:  Licensed Clinical  Author Type:      Filed:  07/12/19 1228 Date of Service:  07/12/19 0935 Status:  Signed    :  Carlee Ramachandran ()       Patient: Álvaro Starks    Date: 07/12/19  Time: 9:35 AM    [facility]  Notes  LMSW will continue to provide emotional support. Pt's son and daughter in law came to the [facility] ETOWAH meeting. Pt has been changed to routine level of care today. Level of care discussion was had and routine room and board fees $.  Pt has rebounded and her family will be taking her home with them. LMSW will arrange for delivery of medical equipment. Plan for DC on Monday. Signed by: Gonzalez Ritchie       [facility] IDG  Notes by Kendra Chavez at 07/12/19 1120  Version 1 of 1    Author:  Kendra Chavez Service:  Spiritual Care Author Type:  Pastoral Care    Filed:  07/12/19 1209 Date of Service:  07/12/19 1120 Status:  Signed    :  Kendra Chavez (Pastoral Care)       Patient: Álvaro Starks    Date: 07/12/19  Time: 11:20 AM    [facility]  Notes  Intervention: Ministry of music, family support, scripture, prayer. Assessed grief response of patient and family. Outcome: Patient appears to be weary. The son says mom is tired.  noted some difference in her expressions. She expressed to her son that 's visit was meaningful. Progressing toward the goal    Note: Patient's son attended IDG. Patient's treatment discussed. Transfer to the In home program discussed. Signed by: Amando Ren       [address] [place] [address] IDG  Notes by Glo Butcher at 07/09/19 9145  Version 1 of 1    Author:  Glo Butcher Service:  Licensed Clinical  Author Type:      Filed:  07/09/19 0822 Date of Service:  07/09/19 9772 Status:  Signed    :  Glo Butcher ()       Patient: López Whitehead    Date: 07/09/19  Time: 8:22 AM    [facility]  Notes  LMSW has reviewed the initial Rn Comprehensive assessment and plan of care. Acknowledge there is no immediate needs for SW. Signed by: Ethan Dewitt       [facility] IDG Volunteer Notes by Yair Guzman at 07/08/19 1944  Version 1 of 1    Author:  Yair Guzman Service:  Clifford Rojas Author Type:  Hospice Volunteer/    Filed:  07/08/19 1944 Date of Service:  07/08/19 1944 Status:  Signed    :  Yair Guzman (Hospice Volunteer/)       Patient: López Whitehead    Date: 07/08/19  Time: 7:44 PM    [facility] Volunteer Notes  VC has reviewed the initial RN Comprehensive assessment and plan of care. Acknowledge there are no immediate needs for volunteers. Signed by: Chelsie Barnes IDG Nurse Notes by Brenda Costello RN at 07/08/19 1528  Version 5 of 5    Author:  Brenda Costello RN Service:  NURSING Author Type:  Registered Nurse    Filed:  07/08/19 1906 Date of Service:  07/08/19 1528 Status:  Addendum    :  Brenda Costello RN (Registered Nurse)    Related Notes:  Original Note by Brenda Costello RN (Registered Nurse) filed at 07/08/19 1712       Patient: López Whitehead    Date: 07/08/19  Time: 3:28 PM    [facility] Nurse Notes  López Whitehead 79 y/o female admitted gip for respiratory failure. Symptom management of pain/dyspnea/agitation  Pt arrived via ems on 6 liters nasal canula per Alexandru rn. Pt alert and oriented X3. Pt states she get short of breath with exertion.  Pt has no shortness of breath while i'm talking to her but does state that she took something for anxiety before her ambulance ride over. Pt lungs coarse. Pt has hypoactive bowel sounds. She is continent of bowel and bladder. Assist with turns X1. Pt has some discoloration to BLE. Family states that she is a feeder. That she lacks the endurance to feed herself. Pt is total care for all adl's. Admission has been completed and inpatient GIP care plan initiated including bereavement, spiritual, and psychosocial. IDG team made aware of plan of care and immediate needs. ?  1711 Pt po meds given. Pt eating dinner, no complaints. Pt denies pain, agitation or sob. 1905 report given to shirin mcginnis            Signed by: Meghan Cooney RN       [facility] IDG Nurse Notes by Ny Humphreys RN at 07/08/19 1528  Version 4 of 5    Author:  Ny Humphreys RN Service:  NURSING Author Type:  Registered Nurse    Filed:  07/08/19 1712 Date of Service:  07/08/19 1528 Status:  Addendum    :  Ny Humphreys RN (Registered Nurse)    Related Notes:  Addendum by Ny Humphreys RN (Registered Nurse) filed at 07/08/19 1906  Original Note by Ny Humphreys RN (Registered Nurse) filed at 07/08/19 1551       Patient: Oscar Huber    Date: 07/08/19  Time: 3:28 PM    [facility] Nurse Notes  Oscar Huber 81 y/o female admitted gip for respiratory failure. Symptom management of pain/dyspnea/agitation  Pt arrived via ems on 6 liters nasal canula per Alexandru smith. Pt alert and oriented X3. Pt states she get short of breath with exertion. Pt has no shortness of breath while i'm talking to her but does state that she took something for anxiety before her ambulance ride over. Pt lungs coarse. Pt has hypoactive bowel sounds. She is continent of bowel and bladder. Assist with turns X1. Pt has some discoloration to BLE. Family states that she is a feeder. That she lacks the endurance to feed herself. Pt is total care for all adl's.  Admission has been completed and inpatient GIP care plan initiated including bereavement, spiritual, and psychosocial. IDG team made aware of plan of care and immediate needs. ?  1711 Pt po meds given. Pt eating dinner, no complaints. Pt denies pain, agitation or sob. Signed by: Idania Perez RN       [address] [place] [address] IDG Nurse Notes by Luciana Peralta RN at 07/08/19 1528  Version 3 of 5    Author:  Luciana Peralta RN Service:  NURSING Author Type:  Registered Nurse    Filed:  07/08/19 1551 Date of Service:  07/08/19 1528 Status:  Addendum    :  Luciana Peralta RN (Registered Nurse)    Related Notes:  Addendum by Luciana Peralta RN (Registered Nurse) filed at 07/08/19 1712  Original Note by Luciana Peralta RN (Registered Nurse) filed at 07/08/19 1550       Patient: George Ramirez    Date: 07/08/19  Time: 3:28 PM    [facility] Nurse Notes  George Ramirez 81 y/o female admitted gip for respiratory failure. Symptom management of pain/dyspnea/agitation  Pt arrived via ems on 6 liters nasal canula per Alexandru smith. Pt alert and oriented X3. Pt states she get short of breath with exertion. Pt has no shortness of breath while i'm talking to her but does state that she took something for anxiety before her ambulance ride over. Pt lungs coarse. Pt has hypoactive bowel sounds. She is continent of bowel and bladder. Assist with turns X1. Pt has some discoloration to BLE. Family states that she is a feeder. That she lacks the endurance to feed herself. Pt is total care for all adl's. Admission has been completed and inpatient GIP care plan initiated including bereavement, spiritual, and psychosocial. IDG team made aware of plan of care and immediate needs. ?            Signed by:  Idania Perez RN       [address] [place] [address] IDG Nurse Notes by Luciana Peralta RN at 07/08/19 1528  Version 2 of 5    Author:  Luciana Peralta RN Service:  NURSING Author Type:  Registered Nurse    Filed:  07/08/19 1550 Date of Service:  07/08/19 1528 Status:  Addendum    :  Luciana Peralta RN (Registered Nurse)    Related Notes:  Addendum by Horacio Fields RN (Registered Nurse) filed at 07/08/19 1551  Original Note by Horacio Fields RN (Registered Nurse) filed at 07/08/19 1539       Patient: Marylen Savage    Date: 07/08/19  Time: 3:28 PM    [facility] Nurse Notes  Marylen Savage 79 y/o female admitted gip for respiratory failure. Symptom management of pain/dyspnea/agitation  Pt arrived via ems on 6 liters nasal canula per Alexandru rn. Pt alert and oriented X3. Pt states she get short of breath with exertion. Pt has no shortness of breath while i'm talking to her but does state that she took something for anxiety before her ambulance ride over. Pt lungs coarse. Pt has hypoactive bowel sounds. She is continent of bowel and bladder. Assist with turns X1. Pt has some discoloration to BLE. Family states that she is a feeder. That she lacks the endurance to feed herself. Pt is total care for all adl's. Admission has been completed and inpatient GIP care plan initiated including bereavement, spiritual, and psychosocial. IDG team made aware of plan of care and immediate needs. ?            Signed by: Tena Sunshine RN       [facility] IDG Nurse Notes by Horacio Fields RN at 07/08/19 1528  Version 1 of 5    Author:  Horacio Fields RN Service:  NURSING Author Type:  Registered Nurse    Filed:  07/08/19 1539 Date of Service:  07/08/19 1528 Status:  Signed    :  Horacio Fields RN (Registered Nurse)    Related Notes:  Addendum by Horacio Fields RN (Registered Nurse) filed at 07/08/19 1550       Patient: Marylen Savage    Date: 07/08/19  Time: 3:28 PM    [facility] Nurse Notes  Pt arrived via ems on 6 liters nasal canula per Alexandru rn. Pt alert and oriented X3. Pt states she get short of breath with exertion. Pt has no shortness of breath while i'm talking to her but does state that she took something for anxiety before her ambulance ride over. Pt lungs coarse. Pt has hypoactive bowel sounds.  She is continent of bowel and bladder. Assist with turns X1. Pt has some discoloration to BLE. Family states that she is a feeder. That she lacks the endurance to feed herself. Pt is total care for all adl's. Signed by: Huan Guidry RN       [facility] IDG  Notes by Ed Layton at 07/08/19 1659  Version 1 of 1    Author:  Ed Layton Service:  Spiritual Care Author Type:  Pastoral Care    Filed:  07/08/19 1700 Date of Service:  07/08/19 1659 Status:  Signed    :  Ed Layton (Pastoral Care)       Patient: Imer Haynes    Date: 07/08/19  Time: 4:59 PM    [facility]  Notes   has reviewed  Initial comprehensive Assessment and Initial Plan of Care for Imer Haynes.  is in agreement with the plans put in place and goals set thus far.  will be making a Spiritual and bereavement Assessment to determine needs that can be supported through [facility]. Signed by: Matteo Moore       [facility] IDG Other Notes by Naima Warren at 07/08/19 1605  Version 1 of 1    Author:  Naima Warren Service:  HOSPICE Author Type:  Pastoral Care    Filed:  07/08/19 1606 Date of Service:  07/08/19 1605 Status:  Signed    :  Naima Warren (Pastoral Care)       Bereavement Coordinator has reviewed RN Initial Comprehensive Assessment and reviewed Care Plan. I acknowledge no urgent Bereavement Care needs identified at time of admission. Care Team Present:   Team Members Present: (see sign in sheet for attendees)  Physician Team Member: Dr. Jayne Quigley MD  Case Management Team Member: Deidre Diaz, [facility]  Nurse Team Member: Michelle Ponce RN  Social Work Team Member: Uzma Taylor, [address] [place] Team Member: Lizz Davies.  Div  Vol Coordinator: Van Bowser  Other Discipline Present (Name): Donovan Bernstein

## 2019-07-13 NOTE — PROGRESS NOTES
0700 Report received from Nga Osorio RN. Patient identified by name and . Patient sleeping quietly in bed, son at bedside. No signs or symptoms of distress noted at present. Bed in low and locked position, side rails up x2, call bell within reach, tab alarm in place. 3071 Patient took early am meds whole with iced water. She is sitting up in bed eating breakfast.  Senna held as patient had 3 bowel movements in the last 12 hours. 6433 Additional AM meds given. Patient is out of symbicort and will start [place] today in its place. Night shift RN reported blood in stool. Coumadin order discontinued by NP Bolling Aase. Patient has had multiple loose stools today and becomes short of breath during turning for incontinence care. 1349 She received ativan as premed for duoneb treatment for shortness of breath and audible wheezing. 1407 Wheezing no longer heard but patient still visibly dyspneic. Administered roxanol 5 mg PO for dyspnea. 1440 Patient feeling better, breathing is even and unlabored. Multiple visitors at bedside. 1708 Patient sitting up eating dinner. Took evening dose of prednisone whole. No dyspnea noted at present. 1759 Patient reports pain in her legs rated 8/10. Administered roxanol 5 mg PO. Repositioned in bed after incontinence care for stool. Changed Landaverde Stat-Lock as it was soiled. [phone] Patient reports relief of her pain from roxanol. Hourly rounds were completed and were otherwise unremarkable. 1900 Report given to Nga Osorio RN.

## 2019-07-13 NOTE — PROGRESS NOTES
Received report from Erin Solorzano RN. Pt I'd by name and . Pt c/o bilateral leg pain 7/10. Roxanol 5mg po given. Resp non labored on 5L n/c. No edfema noted. BD hypo. Landaverde cath draining joseph urine. Pt repositioned in bed. Medium BM noted. Incontinent care completed. SR up x2. Bed low/locked. Call light with in reach. Family at the bedside.   Pt resting comfortably. Denies pain at this time. Resp non labored on 5L n/c. Scheduled ativan 1mg po given. Senna hed due to multiple BM's last 24 hours. SR up x2. Bed low/locked. Call light with in reach. Door opened. Family at the bedside. 2250  Pt sleeping. Calm. No distress. No facial grimace. Flacc =0-1. Resp non labored on 5L n/c. Pt has med stool noted. Incontinent care completed. Pt repositioned on right side. SR up x2. Bed low/locked. Call light with in reach. Family at the bedside     0123  Pt sleeping. Calm. No distress. No facial grimace. Flacc =0-1. Resp non labored on 5L n/c.  SR up x2. Bed low/locked. Call light with in reach. Family at the bedside. 0016  Pt resting comfortably. No distress. No facial grimace. Flacc =0-1. Resp non labored on 5L n/c. Pt repositioned in bed. Medium BM noted. Incontinent care completed. SR up x2. Bed low/locked. Call light with in reach. Family at the bedside. 0549    Pt resting comfortably. No facial grimace. Flacc =0-1. Resp non labored on 5L n/c. Pt repositioned in bed. Medium BM noted. Incontinent care completed. Landaverde cath emptied. Documented out put on flow sheet. SR up x2. Bed low/locked. Call light with in reach. Family at the bedside. Report given to Erin Solorzano RN.

## 2019-07-13 NOTE — PROGRESS NOTES
Problem: Dyspnea Due to End of Life  Goal: Demonstrate understanding of and ability to manage respiratory symptoms at end of life  Description  Pt will request breathing treatments as needed to manage S&S of dyspnea   Outcome: Progressing Towards Goal  Note:   Patient has required duoneb x1 and roxanol x1 for dyspnea. They were effective in relieving her dyspnea.

## 2019-07-14 NOTE — PROGRESS NOTES
0- Received bedside report from Dong Alvarenga RN. Pt identified and noted to be resting comfortably in bed. Pt's eyes are closed and she is noted to be on room air. No S&S of agitation, SOB, nausea, or vomiting. No further needs at this time. No family noted at bedside. Bed low and locked, call light within reach, tab alarm system in place. Door remains open for frequent rounding and observation. 2158- Morphine po given for pain 5/10. Ativan given po for anxiety related to getting a breathing treatment. Pt had moderate sized BM. Cleaned up, applied barrier cream, and repositioned. Son at bedside. 2250- Pt had small BM. Cleaned up, barrier applied, and repositioned. Denies any pain at this time. Son at bedside. 0050- pt resting quietly with eyes closed. No distress noted. Son sleeping at bedside. 0215- pt had moderate sized BM. Cleaned up, applied barrier cream, and repositioned. Pt back to resting quietly before RN left the room. 0400- Pt resting quietly with eyes closed. No distress noted. Son sleeping at bedside. 0530- Pt had small BM. Cleaned up, barrier cream applied, and pt repositioned. VS done per flowsheet. Pt denies pain at this time. 5609- report given to Lester Gómez RN.

## 2019-07-14 NOTE — PROGRESS NOTES
07 Report received from Sarita Henning RN. Patient identified by name and . Patient sleeping quietly in bed. No signs or symptoms of distress noted at present. Bed in low and locked position, side rails up x2, call bell within reach, tab alarm in place. Son present at bedside. 2193 Patient took early AM meds whole with breakfast.  Continuing to hold senna due to multiple stools in the last 24 hours. 8655 Patient took remaining AM meds after finishing breakfast.  She denies pain or shortness of breath at present. Her son is present at bedside. 1300 Incontinence care provided for small soft stool. 175 Roxanol 5 mg given for mild dyspnea. Hourly rounds were completed and were otherwise unremarkable.  Report given to Noam Guajardo RN.

## 2019-07-15 NOTE — DISCHARGE SUMMARY
Discharge Summary     Patient: Elaine Steel MRN: [id number]  SSN: [id number]    YOB: 1934  Age: 80 y.o. Sex: female       Admit Date: 7/8/2019    Discharge Date: 7/15/2019 at 1600    Admission Diagnoses: Acute on chronic respiratory failure with hypoxia (HCC) [J96.21]  Acute on chronic respiratory failure with hypoxia (HCC) [J96.21]  Acute on chronic respiratory failure with hypoxia (HCC) [J96.21]    Discharge Diagnoses:   Problem List as of 7/15/2019 Date Reviewed: 7/8/2019          Codes Class Noted - Resolved    * (Principal) Acute on chronic respiratory failure with hypoxia ([facility] ICD-10-CM: J96.21  ICD-9-CM: 518.84, 799.02  7/8/2019 - Present        Pulmonary emboli ([facility] ICD-10-CM: I26.99  ICD-9-CM: 415.19  7/8/2019 - Present    Overview Signed 7/8/2019  2:56 PM by Cydney Zavala NP     RLL             HCAP (healthcare-associated pneumonia) ICD-10-CM: J18.9  ICD-9-CM: 486  7/8/2019 - Present        Lactic acidosis ICD-10-CM: E87.2  ICD-9-CM: 276.2  7/8/2019 - Present        Graves' disease ICD-10-CM: E05.00  ICD-9-CM: 242.00  7/8/2019 - Present        Sepsis ([facility] ICD-10-CM: A41.9  ICD-9-CM: 038.9, 995.91  6/25/2019 - Present        Acute on chronic combined systolic and diastolic congestive heart failure ([facility] ICD-10-CM: I50.43  ICD-9-CM: 428.43, 428.0  6/7/2019 - Present        Centrilobular emphysema ([facility] ICD-10-CM: J43.2  ICD-9-CM: 492.8  6/9/2016 - Present        COPD exacerbation ([facility] ICD-10-CM: J44.1  ICD-9-CM: 491.21  8/20/2014 - Present        Hyperthyroidism ICD-10-CM: E05.90  ICD-9-CM: 242.90  2/27/2013 - Present        Rheumatoid arthritis ([facility] ICD-10-CM: M06.9  ICD-9-CM: 714.0  9/24/2012 - Present               Discharge Condition: [facility] [place] [address] Course: Discharge from [facility] to routine stay at [facility] to home with [facility]. DC 7/15/19 at 1600 via ambulance. Status at time of discharge is routine.     Consults: None    Significant Diagnostic Studies: None    Disposition: home with [facility]    Discharge Medications:   Current Discharge Medication List      START taking these medications    Details   omeprazole (PRILOSEC) 20 mg capsule Take 1 Cap by mouth daily. Qty: 5 Cap, Refills: 0      acetaminophen (TYLENOL) 325 mg tablet Take 2 Tabs by mouth every four (4) hours as needed for Pain or Fever. Qty: 10 Tab, Refills: 0      albuterol-ipratropium (DUO-NEB) 2.5 mg-0.5 mg/3 ml nebu 3 mL by Nebulization route every four (4) hours as needed FOR Shortness of breath OR WHEEZING). Qty: 30 Nebule, Refills: 0      fluconazole (DIFLUCAN) 200 mg tablet Take 1 Tab by mouth on 7/18/19    Qty: 1 Tab, Refills: 0      morphine (ROXANOL) 20 mg/mL concentrated oral syringe Take 5mg=0.25 mL by mouth every two (2) hours as needed pain or shortness of breath. Qty: 30 mL, Refills: 0          polyvinyl alcohol (LIQUIFILM TEARS) 1.4 % ophthalmic solution Administer 1 Drop to both eyes as needed (eye irritation) for up to 30 days. Qty: 15 mL, Refills: 0         CONTINUE these medications which have CHANGED    Details   methIMAzole (TAPAZOLE) 10 mg tablet Take 5mg=0.5 Tabs by mouth daily. Qty: 1 Tab, Refills: 0      predniSONE (DELTASONE) 10 mg tablet Take 10 mg by mouth two (2) times daily (with meals). Qty: 10 Tab, Refills: 0      senna (SENOKOT) 8.6 mg tablet Take 1 Tab by mouth two (2) times a day. Qty: 10 Tab, Refills: 0      cetirizine (ZYRTEC) 10 mg tablet Take 1 Tab by mouth daily as needed for Allergies. Qty: 5 Tab, Refills: 0      clonazePAM (KLONOPIN) 0.5mg tablet Take 1 tab every 12 hours. Qty: 10 tab, Refills 0     clonazePAM (KLONOPIN) 0.5 mg tablet Take 1 Tab by mouth  three (3) times daily as needed (anxiety). Qty: 15 Tab, Refills: 0          fluticasone furoate-vilanterol (BREO ELLIPTA) 100-25 mcg/dose inhaler Take 1 Puff by inhalation daily.    Qty: 1 Inhaler, Refills: 0      fluticasone propionate (FLONASE ALLERGY RELIEF) 50 mcg/actuation nasal spray 2 Sprays by Both Nostrils route daily. Qty: 1 Bottle, Refills: 0      furosemide (LASIX) 20 mg tablet Take 1 Tab by mouth daily. Qty: 5 Tab, Refills: 0      ondansetron (ZOFRAN ODT) 4 mg disintegrating tablet Take 1 Tab by mouth every six (6) hours as needed for Nausea. Qty: 10 Tab, Refills: 0         CONTINUE these medications which have NOT CHANGED    Details   OXYGEN-AIR DELIVERY SYSTEMS 6 L/min by Nasal route continuous. With humidification         STOP taking these medications       budesonide-formoterol (SYMBICORT) 160-4.5 mcg/actuation HFAA Comments:   Reason for Stopping:         apixaban (ELIQUIS) 5 mg tablet Comments:   Reason for Stopping:         docusate sodium (COLACE) 100 mg capsule Comments:   Reason for Stopping:         pantoprazole (PROTONIX) 40 mg tablet Comments:   Reason for Stopping:         calcium-cholecalciferol, D3, (CALCARB 600 WITH VITAMIN D) tablet Comments:   Reason for Stopping:               Activity: Activity as tolerated with assist.   Diet: soft diet  Wound Care: Routine catheter care, inserted 19  Wound Care:  Location: Perineal, dominik cleft and buttocks  Excoriation- Cleanse wound location with wound cleanser, pat dry and apply Antifungal Cream every 12 hours and PRN if soiled. Oxygen: 6L/min via NC  Equipment: Equipment already delivered to the home: bed, table, nebulizer machine. Oxygen concentrator with backup tank. Follow-up Appointments   Procedures    FOLLOW UP VISIT Appointment in: Other (Specify) Follow-up with home care RN on day of discharge. Follow-up with home care RN on day of discharge. Standing Status:   Standing     Number of Occurrences:   1     Order Specific Question:   Appointment in     Answer:    Other (Specify)       Signed By: Enmanuel Bonner NP     July 15, 2019

## 2019-07-15 NOTE — [facility] IDG VOLUNTEER NOTES
Patient: Elaine Steel    Date: 07/15/19  Time: 11:22 AM    [facility] Volunteer Notes     Status Codes C=Continued R=Revised RS = Resolved  NV= No visits per Infection Control Policy     C. Volunteer     Volunteers will continue to offer companionship visits to the pt.                       Signed by: Dayanna Gomez

## 2019-07-15 NOTE — HOSPICE
0700: Report received from Vanessa Panchal, [facility]. Pt son wanting to chat with Caitlin Lua, [address] the discharge today and wondering about equipment being delivered. Discussed with DEANA Ricardo.     0900: Scheduled medications administered. Pt swallowed pills whole without difficulty. Family at bedside. Numeric pain 0/10.    1300: Pt had medium soft brown/red stool. Calmoseptine paste  and moisture barrier applied to  cleft for excoriation. Medicated with roxanol 5 mg for dyspnea and ativan 1 mg for anxiety per Pt request. Attempted to obtain PT/INR per MD order; tried twice and unable to obtain sufficient amount of blood. Asked charge RN, Jasmina Sanches to assist. Unable to obtain blood test x3. Updated NP.     1600: Pt leaving via EMS. Son is transporting meds home with him. Pt was medicated with ativan 1 mg PO per Dr Lei Domínguez and family request during transport. Confirmed address to transport to is [address] [facility]  Pt pharmacy is [facility] . [facility] card given to son as provided by Jim Owen. Report called to Amairani Sethi who will be doing the tuck in visit today. Pt left with sahu intact and drained 500 ml UOP. Creams for back side provided for Pt care at home. Small nebulizer mask sent with Pt.

## 2019-07-15 NOTE — PROGRESS NOTES
Problem: Falls - Risk of  Goal: *Absence of Falls  Description  Pt will remain free from falls while at [address] aeb no injuries.    Outcome: Progressing Towards Goal  Note:   Fall Risk Interventions:  Mobility Interventions: Bed/chair exit alarm    Mentation Interventions: Adequate sleep, hydration, pain control, Bed/chair exit alarm, Door open when patient unattended, Evaluate medications/consider consulting pharmacy, Familiar objects from home, Family/sitter at bedside, Room close to nurse's station, Reorient patient, Update white board    Medication Interventions: Bed/chair exit alarm, Evaluate medications/consider consulting pharmacy    Elimination Interventions: Bed/chair exit alarm, Call light in reach, Patient to call for help with toileting needs              Problem: Pressure Injury - Risk of  Goal: *Prevention of pressure injury  Description  Pt will not develop any pressure injuries and will be receptive to methods to prevent them during this shift   Outcome: Progressing Towards Goal  Note:   Pressure Injury Interventions:  Sensory Interventions: Assess changes in LOC, Assess need for specialty bed, Avoid rigorous massage over bony prominences, Check visual cues for pain, Float heels, Keep linens dry and wrinkle-free, Minimize linen layers, Monitor skin under medical devices    Moisture Interventions: Absorbent underpads, Apply protective barrier, creams and emollients, Assess need for specialty bed, Limit adult briefs, Maintain skin hydration (lotion/cream), Minimize layers, Moisture barrier    Activity Interventions: Assess need for specialty bed    Mobility Interventions: Float heels, Assess need for specialty bed    Nutrition Interventions: Document food/fluid/supplement intake, Offer support with meals,snacks and hydration    Friction and Shear Interventions: Apply protective barrier, creams and emollients, Feet elevated on foot rest, Lift sheet                Problem: Aide Supervisory Visit  Goal: Supervision of Home Health Aide  Outcome: Progressing Towards Goal     Problem: Spiritual Distress  Goal: Distress heard, acknowledged, and addressed  Description  Patient/family/caregiver distress will be heard, acknowledged, and addressed throughout hospice care.   Outcome: Progressing Towards Goal

## 2019-08-12 ENCOUNTER — HOME CARE VISIT (OUTPATIENT)
Dept: HOSPICE | Facility: HOSPICE | Age: 84
End: 2019-08-12
Payer: MEDICARE